# Patient Record
Sex: FEMALE | Race: WHITE | NOT HISPANIC OR LATINO | Employment: PART TIME | URBAN - METROPOLITAN AREA
[De-identification: names, ages, dates, MRNs, and addresses within clinical notes are randomized per-mention and may not be internally consistent; named-entity substitution may affect disease eponyms.]

---

## 2019-11-26 ENCOUNTER — TRANSFERRED RECORDS (OUTPATIENT)
Dept: HEALTH INFORMATION MANAGEMENT | Facility: CLINIC | Age: 29
End: 2019-11-26

## 2019-11-27 ENCOUNTER — TRANSFERRED RECORDS (OUTPATIENT)
Dept: HEALTH INFORMATION MANAGEMENT | Facility: CLINIC | Age: 29
End: 2019-11-27

## 2019-11-28 ENCOUNTER — TRANSFERRED RECORDS (OUTPATIENT)
Dept: HEALTH INFORMATION MANAGEMENT | Facility: CLINIC | Age: 29
End: 2019-11-28

## 2019-12-01 ENCOUNTER — TRANSFERRED RECORDS (OUTPATIENT)
Dept: HEALTH INFORMATION MANAGEMENT | Facility: CLINIC | Age: 29
End: 2019-12-01

## 2019-12-09 ENCOUNTER — TRANSFERRED RECORDS (OUTPATIENT)
Dept: HEALTH INFORMATION MANAGEMENT | Facility: CLINIC | Age: 29
End: 2019-12-09

## 2019-12-11 ENCOUNTER — APPOINTMENT (OUTPATIENT)
Dept: ULTRASOUND IMAGING | Facility: CLINIC | Age: 29
End: 2019-12-11
Attending: EMERGENCY MEDICINE
Payer: COMMERCIAL

## 2019-12-11 ENCOUNTER — APPOINTMENT (OUTPATIENT)
Dept: CT IMAGING | Facility: CLINIC | Age: 29
End: 2019-12-11
Attending: EMERGENCY MEDICINE
Payer: COMMERCIAL

## 2019-12-11 ENCOUNTER — APPOINTMENT (OUTPATIENT)
Dept: ULTRASOUND IMAGING | Facility: CLINIC | Age: 29
End: 2019-12-11
Attending: STUDENT IN AN ORGANIZED HEALTH CARE EDUCATION/TRAINING PROGRAM
Payer: COMMERCIAL

## 2019-12-11 ENCOUNTER — HOSPITAL ENCOUNTER (OUTPATIENT)
Facility: CLINIC | Age: 29
Discharge: HOME | End: 2019-12-11
Attending: EMERGENCY MEDICINE | Admitting: FAMILY MEDICINE
Payer: COMMERCIAL

## 2019-12-11 VITALS
OXYGEN SATURATION: 95 % | RESPIRATION RATE: 18 BRPM | WEIGHT: 217.9 LBS | BODY MASS INDEX: 37.2 KG/M2 | HEART RATE: 78 BPM | SYSTOLIC BLOOD PRESSURE: 110 MMHG | TEMPERATURE: 98.5 F | HEIGHT: 64 IN | DIASTOLIC BLOOD PRESSURE: 72 MMHG

## 2019-12-11 DIAGNOSIS — I26.99 ACUTE PULMONARY EMBOLISM WITHOUT ACUTE COR PULMONALE, UNSPECIFIED PULMONARY EMBOLISM TYPE (H): ICD-10-CM

## 2019-12-11 DIAGNOSIS — R07.9 ACUTE CHEST PAIN: ICD-10-CM

## 2019-12-11 LAB
ALBUMIN SERPL-MCNC: 3.4 G/DL (ref 3.4–5)
ALBUMIN UR-MCNC: NEGATIVE MG/DL
ALP SERPL-CCNC: 105 U/L (ref 40–150)
ALT SERPL W P-5'-P-CCNC: 37 U/L (ref 0–50)
ANION GAP SERPL CALCULATED.3IONS-SCNC: 6 MMOL/L (ref 3–14)
APPEARANCE UR: CLEAR
AST SERPL W P-5'-P-CCNC: 20 U/L (ref 0–45)
BASOPHILS # BLD AUTO: 0.1 10E9/L (ref 0–0.2)
BASOPHILS NFR BLD AUTO: 0.8 %
BILIRUB DIRECT SERPL-MCNC: <0.1 MG/DL (ref 0–0.2)
BILIRUB SERPL-MCNC: 0.2 MG/DL (ref 0.2–1.3)
BILIRUB UR QL STRIP: NEGATIVE
BUN SERPL-MCNC: 18 MG/DL (ref 7–30)
CALCIUM SERPL-MCNC: 8.7 MG/DL (ref 8.5–10.1)
CHLORIDE SERPL-SCNC: 108 MMOL/L (ref 94–109)
CO2 SERPL-SCNC: 27 MMOL/L (ref 20–32)
COLOR UR AUTO: YELLOW
CREAT SERPL-MCNC: 0.6 MG/DL (ref 0.52–1.04)
CREAT SERPL-MCNC: 0.6 MG/DL (ref 0.52–1.04)
CREAT UR-MCNC: 73 MG/DL
D DIMER PPP FEU-MCNC: 1.7 UG/ML FEU (ref 0–0.5)
DIFFERENTIAL METHOD BLD: NORMAL
EOSINOPHIL # BLD AUTO: 0.3 10E9/L (ref 0–0.7)
EOSINOPHIL NFR BLD AUTO: 3.1 %
ERYTHROCYTE [DISTWIDTH] IN BLOOD BY AUTOMATED COUNT: 13.4 % (ref 10–15)
GFR SERPL CREATININE-BSD FRML MDRD: >90 ML/MIN/{1.73_M2}
GFR SERPL CREATININE-BSD FRML MDRD: >90 ML/MIN/{1.73_M2}
GLUCOSE SERPL-MCNC: 140 MG/DL (ref 70–99)
GLUCOSE UR STRIP-MCNC: NEGATIVE MG/DL
HCG SERPL QL: NEGATIVE
HCT VFR BLD AUTO: 39 % (ref 35–47)
HGB BLD-MCNC: 12.5 G/DL (ref 11.7–15.7)
HGB UR QL STRIP: ABNORMAL
IMM GRANULOCYTES # BLD: 0 10E9/L (ref 0–0.4)
IMM GRANULOCYTES NFR BLD: 0.4 %
INR PPP: 1.03 (ref 0.86–1.14)
INTERPRETATION ECG - MUSE: NORMAL
KETONES UR STRIP-MCNC: NEGATIVE MG/DL
LEUKOCYTE ESTERASE UR QL STRIP: ABNORMAL
LMWH PPP CHRO-ACNC: 1.22 IU/ML
LYMPHOCYTES # BLD AUTO: 2.4 10E9/L (ref 0.8–5.3)
LYMPHOCYTES NFR BLD AUTO: 28.1 %
MCH RBC QN AUTO: 30.7 PG (ref 26.5–33)
MCHC RBC AUTO-ENTMCNC: 32.1 G/DL (ref 31.5–36.5)
MCV RBC AUTO: 96 FL (ref 78–100)
MONOCYTES # BLD AUTO: 0.4 10E9/L (ref 0–1.3)
MONOCYTES NFR BLD AUTO: 4.5 %
MUCOUS THREADS #/AREA URNS LPF: PRESENT /LPF
NEUTROPHILS # BLD AUTO: 5.3 10E9/L (ref 1.6–8.3)
NEUTROPHILS NFR BLD AUTO: 63.1 %
NITRATE UR QL: NEGATIVE
NRBC # BLD AUTO: 0 10*3/UL
NRBC BLD AUTO-RTO: 0 /100
PH UR STRIP: 6 PH (ref 5–7)
PLATELET # BLD AUTO: 267 10E9/L (ref 150–450)
PLATELET # BLD AUTO: 268 10E9/L (ref 150–450)
POTASSIUM SERPL-SCNC: 3.5 MMOL/L (ref 3.4–5.3)
PROT SERPL-MCNC: 7.2 G/DL (ref 6.8–8.8)
PROT UR-MCNC: 0.11 G/L
PROT/CREAT 24H UR: 0.15 G/G CR (ref 0–0.2)
RADIOLOGIST FLAGS: ABNORMAL
RBC # BLD AUTO: 4.07 10E12/L (ref 3.8–5.2)
RBC #/AREA URNS AUTO: 1 /HPF (ref 0–2)
SODIUM SERPL-SCNC: 141 MMOL/L (ref 133–144)
SOURCE: ABNORMAL
SP GR UR STRIP: 1.02 (ref 1–1.03)
SQUAMOUS #/AREA URNS AUTO: 4 /HPF (ref 0–1)
TRANS CELLS #/AREA URNS HPF: <1 /HPF (ref 0–1)
TROPONIN I SERPL-MCNC: <0.015 UG/L (ref 0–0.04)
UROBILINOGEN UR STRIP-MCNC: NORMAL MG/DL (ref 0–2)
WBC # BLD AUTO: 8.4 10E9/L (ref 4–11)
WBC #/AREA URNS AUTO: 11 /HPF (ref 0–5)

## 2019-12-11 PROCEDURE — 25000132 ZZH RX MED GY IP 250 OP 250 PS 637: Performed by: EMERGENCY MEDICINE

## 2019-12-11 PROCEDURE — 85049 AUTOMATED PLATELET COUNT: CPT | Performed by: FAMILY MEDICINE

## 2019-12-11 PROCEDURE — 85025 COMPLETE CBC W/AUTO DIFF WBC: CPT | Performed by: EMERGENCY MEDICINE

## 2019-12-11 PROCEDURE — 85610 PROTHROMBIN TIME: CPT | Performed by: FAMILY MEDICINE

## 2019-12-11 PROCEDURE — 93308 TTE F-UP OR LMTD: CPT | Mod: 26 | Performed by: EMERGENCY MEDICINE

## 2019-12-11 PROCEDURE — 99285 EMERGENCY DEPT VISIT HI MDM: CPT | Mod: 25 | Performed by: EMERGENCY MEDICINE

## 2019-12-11 PROCEDURE — 80076 HEPATIC FUNCTION PANEL: CPT | Performed by: EMERGENCY MEDICINE

## 2019-12-11 PROCEDURE — 93308 TTE F-UP OR LMTD: CPT | Performed by: EMERGENCY MEDICINE

## 2019-12-11 PROCEDURE — 96374 THER/PROPH/DIAG INJ IV PUSH: CPT | Performed by: EMERGENCY MEDICINE

## 2019-12-11 PROCEDURE — 84156 ASSAY OF PROTEIN URINE: CPT | Performed by: STUDENT IN AN ORGANIZED HEALTH CARE EDUCATION/TRAINING PROGRAM

## 2019-12-11 PROCEDURE — 93010 ELECTROCARDIOGRAM REPORT: CPT | Mod: 59 | Performed by: EMERGENCY MEDICINE

## 2019-12-11 PROCEDURE — 84703 CHORIONIC GONADOTROPIN ASSAY: CPT | Performed by: EMERGENCY MEDICINE

## 2019-12-11 PROCEDURE — 96365 THER/PROPH/DIAG IV INF INIT: CPT | Performed by: EMERGENCY MEDICINE

## 2019-12-11 PROCEDURE — 25000128 H RX IP 250 OP 636: Performed by: STUDENT IN AN ORGANIZED HEALTH CARE EDUCATION/TRAINING PROGRAM

## 2019-12-11 PROCEDURE — 80048 BASIC METABOLIC PNL TOTAL CA: CPT | Performed by: EMERGENCY MEDICINE

## 2019-12-11 PROCEDURE — 36415 COLL VENOUS BLD VENIPUNCTURE: CPT | Performed by: FAMILY MEDICINE

## 2019-12-11 PROCEDURE — 85379 FIBRIN DEGRADATION QUANT: CPT | Performed by: EMERGENCY MEDICINE

## 2019-12-11 PROCEDURE — 96375 TX/PRO/DX INJ NEW DRUG ADDON: CPT | Performed by: EMERGENCY MEDICINE

## 2019-12-11 PROCEDURE — 36415 COLL VENOUS BLD VENIPUNCTURE: CPT | Performed by: EMERGENCY MEDICINE

## 2019-12-11 PROCEDURE — 96366 THER/PROPH/DIAG IV INF ADDON: CPT

## 2019-12-11 PROCEDURE — G0378 HOSPITAL OBSERVATION PER HR: HCPCS

## 2019-12-11 PROCEDURE — 93005 ELECTROCARDIOGRAM TRACING: CPT | Mod: 59 | Performed by: EMERGENCY MEDICINE

## 2019-12-11 PROCEDURE — 71275 CT ANGIOGRAPHY CHEST: CPT

## 2019-12-11 PROCEDURE — 25000132 ZZH RX MED GY IP 250 OP 250 PS 637: Performed by: STUDENT IN AN ORGANIZED HEALTH CARE EDUCATION/TRAINING PROGRAM

## 2019-12-11 PROCEDURE — 82565 ASSAY OF CREATININE: CPT | Performed by: STUDENT IN AN ORGANIZED HEALTH CARE EDUCATION/TRAINING PROGRAM

## 2019-12-11 PROCEDURE — 25000132 ZZH RX MED GY IP 250 OP 250 PS 637: Performed by: FAMILY MEDICINE

## 2019-12-11 PROCEDURE — 93970 EXTREMITY STUDY: CPT

## 2019-12-11 PROCEDURE — 99291 CRITICAL CARE FIRST HOUR: CPT | Mod: 25 | Performed by: EMERGENCY MEDICINE

## 2019-12-11 PROCEDURE — 85520 HEPARIN ASSAY: CPT | Performed by: EMERGENCY MEDICINE

## 2019-12-11 PROCEDURE — 76830 TRANSVAGINAL US NON-OB: CPT

## 2019-12-11 PROCEDURE — 81001 URINALYSIS AUTO W/SCOPE: CPT | Performed by: STUDENT IN AN ORGANIZED HEALTH CARE EDUCATION/TRAINING PROGRAM

## 2019-12-11 PROCEDURE — 84484 ASSAY OF TROPONIN QUANT: CPT | Performed by: EMERGENCY MEDICINE

## 2019-12-11 PROCEDURE — 25000128 H RX IP 250 OP 636: Performed by: EMERGENCY MEDICINE

## 2019-12-11 RX ORDER — ASPIRIN 81 MG/1
324 TABLET, CHEWABLE ORAL ONCE
Status: COMPLETED | OUTPATIENT
Start: 2019-12-11 | End: 2019-12-11

## 2019-12-11 RX ORDER — IBUPROFEN 200 MG
600 TABLET ORAL EVERY 6 HOURS PRN
Status: DISCONTINUED | OUTPATIENT
Start: 2019-12-11 | End: 2019-12-11 | Stop reason: HOSPADM

## 2019-12-11 RX ORDER — AMOXICILLIN 250 MG
1 CAPSULE ORAL 2 TIMES DAILY PRN
Status: DISCONTINUED | OUTPATIENT
Start: 2019-12-11 | End: 2019-12-11 | Stop reason: HOSPADM

## 2019-12-11 RX ORDER — NALOXONE HYDROCHLORIDE 0.4 MG/ML
.1-.4 INJECTION, SOLUTION INTRAMUSCULAR; INTRAVENOUS; SUBCUTANEOUS
Status: DISCONTINUED | OUTPATIENT
Start: 2019-12-11 | End: 2019-12-11 | Stop reason: HOSPADM

## 2019-12-11 RX ORDER — IOPAMIDOL 755 MG/ML
72 INJECTION, SOLUTION INTRAVASCULAR ONCE
Status: COMPLETED | OUTPATIENT
Start: 2019-12-11 | End: 2019-12-11

## 2019-12-11 RX ORDER — HEPARIN SODIUM 10000 [USP'U]/100ML
0-3500 INJECTION, SOLUTION INTRAVENOUS CONTINUOUS
Status: DISCONTINUED | OUTPATIENT
Start: 2019-12-11 | End: 2019-12-11

## 2019-12-11 RX ORDER — AMOXICILLIN 250 MG
2 CAPSULE ORAL 2 TIMES DAILY PRN
Status: DISCONTINUED | OUTPATIENT
Start: 2019-12-11 | End: 2019-12-11 | Stop reason: HOSPADM

## 2019-12-11 RX ORDER — IBUPROFEN 200 MG
200 TABLET ORAL EVERY 4 HOURS PRN
COMMUNITY

## 2019-12-11 RX ORDER — LIDOCAINE 40 MG/G
CREAM TOPICAL
Status: DISCONTINUED | OUTPATIENT
Start: 2019-12-11 | End: 2019-12-11 | Stop reason: HOSPADM

## 2019-12-11 RX ORDER — WARFARIN SODIUM 5 MG/1
TABLET ORAL
Qty: 10 TABLET | Refills: 0 | Status: SHIPPED | OUTPATIENT
Start: 2019-12-11 | End: 2019-12-13 | Stop reason: DRUGHIGH

## 2019-12-11 RX ORDER — WARFARIN SODIUM 5 MG/1
5 TABLET ORAL
Status: COMPLETED | OUTPATIENT
Start: 2019-12-11 | End: 2019-12-11

## 2019-12-11 RX ORDER — OXYCODONE HYDROCHLORIDE 5 MG/1
5-10 TABLET ORAL EVERY 6 HOURS PRN
Status: DISCONTINUED | OUTPATIENT
Start: 2019-12-11 | End: 2019-12-11 | Stop reason: HOSPADM

## 2019-12-11 RX ORDER — ACETAMINOPHEN 325 MG/1
650 TABLET ORAL EVERY 4 HOURS PRN
Status: DISCONTINUED | OUTPATIENT
Start: 2019-12-11 | End: 2019-12-11 | Stop reason: HOSPADM

## 2019-12-11 RX ORDER — ACETAMINOPHEN 325 MG/1
325-650 TABLET ORAL EVERY 6 HOURS PRN
COMMUNITY

## 2019-12-11 RX ADMIN — WARFARIN SODIUM 5 MG: 5 TABLET ORAL at 17:04

## 2019-12-11 RX ADMIN — IBUPROFEN 600 MG: 200 TABLET, FILM COATED ORAL at 13:10

## 2019-12-11 RX ADMIN — ACETAMINOPHEN 650 MG: 325 TABLET, FILM COATED ORAL at 15:45

## 2019-12-11 RX ADMIN — ASPIRIN 81 MG CHEWABLE TABLET 324 MG: 81 TABLET CHEWABLE at 00:45

## 2019-12-11 RX ADMIN — ACETAMINOPHEN 650 MG: 325 TABLET, FILM COATED ORAL at 10:47

## 2019-12-11 RX ADMIN — IOPAMIDOL 72 ML: 755 INJECTION, SOLUTION INTRAVENOUS at 01:22

## 2019-12-11 RX ADMIN — HEPARIN SODIUM 1800 UNITS/HR: 10000 INJECTION, SOLUTION INTRAVENOUS at 01:56

## 2019-12-11 RX ADMIN — ACETAMINOPHEN 650 MG: 325 TABLET, FILM COATED ORAL at 05:11

## 2019-12-11 RX ADMIN — ENOXAPARIN SODIUM 100 MG: 100 INJECTION, SOLUTION INTRAVENOUS; SUBCUTANEOUS at 10:35

## 2019-12-11 RX ADMIN — SERTRALINE HYDROCHLORIDE 50 MG: 50 TABLET ORAL at 07:59

## 2019-12-11 ASSESSMENT — ACTIVITIES OF DAILY LIVING (ADL)
AMBULATION: 0-->INDEPENDENT
SWALLOWING: 0-->SWALLOWS FOODS/LIQUIDS WITHOUT DIFFICULTY
RETIRED_COMMUNICATION: 0-->UNDERSTANDS/COMMUNICATES WITHOUT DIFFICULTY
ADLS_ACUITY_SCORE: 10
BATHING: 0-->INDEPENDENT
FALL_HISTORY_WITHIN_LAST_SIX_MONTHS: NO
ADLS_ACUITY_SCORE: 10
DRESS: 0-->INDEPENDENT
TRANSFERRING: 0-->INDEPENDENT
ADLS_ACUITY_SCORE: 10
TOILETING: 0-->INDEPENDENT
COGNITION: 0 - NO COGNITION ISSUES REPORTED
RETIRED_EATING: 0-->INDEPENDENT

## 2019-12-11 ASSESSMENT — ENCOUNTER SYMPTOMS
LIGHT-HEADEDNESS: 0
WEAKNESS: 0
WHEEZING: 0
FLANK PAIN: 0
PHOTOPHOBIA: 0
ABDOMINAL PAIN: 1
DYSURIA: 0
FEVER: 0
SEIZURES: 0
NAUSEA: 0
DECREASED CONCENTRATION: 0
HEADACHES: 1
DIZZINESS: 0
BLOOD IN STOOL: 0
APPETITE CHANGE: 0
ABDOMINAL DISTENTION: 0
ACTIVITY CHANGE: 1
CONFUSION: 0
VOMITING: 0
PALPITATIONS: 0
SHORTNESS OF BREATH: 0
DIFFICULTY URINATING: 0
CHEST TIGHTNESS: 0
COUGH: 0

## 2019-12-11 ASSESSMENT — PAIN DESCRIPTION - DESCRIPTORS
DESCRIPTORS: HEADACHE
DESCRIPTORS: HEADACHE

## 2019-12-11 ASSESSMENT — MIFFLIN-ST. JEOR
SCORE: 1770.95
SCORE: 1693.39

## 2019-12-11 NOTE — ED TRIAGE NOTES
BIBA from home c/o shortness of breath and substernal chest pain with deep breaths. . 18g PIV, right AC. EMS gave 4mg IV zofran.

## 2019-12-11 NOTE — PROGRESS NOTES
Admission     Admitted from:  UED  Via: светлана   Reason for admission: SOB and Chest pain.   Accompanied by: Danette RN  Family aware of admission: Yes   Belongings: Pt declines sending any belongings/valuables to security at this time. Denies having medications from home  Access: Right PIV  Tele: placed on pt  Height/weight/VS : obtained  Admission profile:  completed  Teaching: Oriented pt to room, use of call light, unit routine. Educated pt on importance of safety, infection prevention, when to call RN (angina, SOB, lightheadedness/dizziness, etc). Pt verbalized understanding.  Skin assessment: completed by Jameel BALDWIN

## 2019-12-11 NOTE — ED NOTES
Bed: ED33  Expected date:   Expected time:   Means of arrival:   Comments:  H414  29 yr F  Chest pain/Shortness of breath  Yellow

## 2019-12-11 NOTE — PLAN OF CARE
"/83 (BP Location: Left arm)   Pulse 78   Temp 98.2  F (36.8  C) (Oral)   Resp 18   Ht 1.626 m (5' 4\")   Wt 98.8 kg (217 lb 14.4 oz)   SpO2 98%   BMI 37.40 kg/m      Neuro: A&Ox4  Cardiac: VSS. SR. States chest pain has improved  Respiratory:  Maintaining adequate O2 sats via RA. Denies SOB.   GI/: voiding without difficulty. LBM yesterday. UA sent   Diet/appetite: Tolerating regular diet. Denies nausea  Activity: Up ad guanakito.   Pain: C/o migraine- tylenol and ibuprofen given with some relief.   Skin: No new skin deficits noted. Incision from  liquid bandaged-CDI and MAX.   LDA's: PIV saline locked.     Plan: Heparin gtt discontinued. Starting lovenox and coumadin today. Educated patient how to administer subcutaneous lovenox injections. Pt is pumping and saving breast milk in lactation room on the first floor. Transvaginal US completed. Plan to possibly discharge tonight vs. Tomorrow morning. Nursing will continue to follow the POC and update the MD team with concerns.      "

## 2019-12-11 NOTE — DISCHARGE SUMMARY
[facility] Discharge Summary       Date of Admission:  2019  Date of Discharge:  2019  6:15 PM  Date of Service: 2019  Discharging Attending Provider: Dr. Feliciano  Discharge Team: [facility] Service    Discharge Diagnoses      Acute pulmonary embolism without acute cor pulmonale, unspecified pulmonary embolism type (H)  Acute chest pain    Follow-ups Needed After Discharge   - Daily INR while bridging from Lovenox to warfarin. Scheduled at [facility] per Care Coordinator.   - Follow-up with PCP within one week.   - Consider starting metformin for PCOS  - F/u on passing blood clots after  on     Hospital Course   Mesha Fields was admitted on 2019 for bilateral PEs following  on  at 23w3d GA for PPROM.  (Pregnancy complicated by pyelnonephritis requiring intensive care admission). The following problems were addressed during her hospitalization:    # Bilateral PEs  # DVTs  # SOB  # Chest pain  Patient presented to ED one hour after sudden onset of chest pain and SOB that did not respond to oxycodone. LE ultrasound showed bilateral DVTs and CT PE showed small bilateral PEs. Risk factors for PE include: post-partum, s/p , recent immobility, obesity. Patient started on heparin, then transitioned to and discharged on Lovenox.  Anticipate bridging for 5 days and then will be on warfarin for 3 months. Will do daily INRs at [facility] (she's currently staying at [facility] while her infant is in the [facility]). Pharmacy recommended starting warfarin at 5mg daily. Advised patient lovenox and warfarin are safe medications while breastfeeding.  - Lovenox 100mg BID for 5 days  - Warfarin 5 mg daily   - Tylenol PRN for pain (avoid ibuprofen if possible)     # Pulmonary nodule   Incidental <4mm nodules found on CT. Per radiology guidelines as no outstanding risk factors  no indication for follow up at this time. Consider work-up if new symptoms arise.      # Continued lochia  Patient continues to pass clots, about a quarter size noted in the toliet, not in liner and otherwise no spotting. Denies vaginal pain, unusual discharge, malodor. Abdominal pain at surgical site feels superficial. Hemodynamically stable. Pelvic US couldn't r/o retained products, but otherwise asymptomatic reassuring and still wnl at 13 days out from .   - F/u with PCP if symptoms do not improve     # Migraine  Similar to previous chronic migraines, though less intense. Doesn't drink coffee, has not tried medications for it other than tylenol. Urine protein/creatinine levels are normal and no HTN, unlikely to be related to Pre-E. Could try triptans in outpatient setting.     # Anxiety   - Continue PTA Zoloft      # PCOS  Not on metformin, had been trying weight loss, but interested in metformin. Can consider starting outpatient.     # Discharge Pain Plan:  - During her hospitalization, Mesha experienced pain due to  incision. Denies lung or chest pain. The pain plan for discharge was discussed with Mesha and the plan was created in a collaborative fashion.    - Continued opioids since : Oxycodone Q6 PRN    Consultations This Hospital Stay   PHARMACY TO DOSE WARFARIN    Code Status   Full Code      Aleksandr Gaxiola, MS3    Sarah Pompa, PGY-1  [facility], [facility]   ______________________________________________________________________  Review of Systems   Constitutional: Positive for activity change. Negative for appetite change and fever.   Eyes: Positive for visual disturbance (some blurry vision in left eye, c/w typical migraines). Negative for photophobia.   Respiratory: Negative for cough, chest tightness, shortness of breath and wheezing.    Cardiovascular: Negative for chest pain, palpitations and leg swelling.    Gastrointestinal: Positive for abdominal pain (suprapubic at level of incision). Negative for abdominal distention, blood in stool, nausea and vomiting.   Genitourinary: Positive for vaginal bleeding (persistent lochia, as described above). Negative for difficulty urinating, dysuria, flank pain, vaginal discharge and vaginal pain.   Skin: Negative for rash.   Neurological: Positive for headaches (chronic, baseline). Negative for dizziness, seizures, weakness and light-headedness.   Psychiatric/Behavioral: Negative for confusion and decreased concentration.     Physical Exam   Vital Signs: Temp: 98.2  F (36.8  C) Temp src: Oral BP: 111/83 Pulse: 78 Heart Rate: 78 Resp: 18 SpO2: 98 % O2 Device: None (Room air)    Weight: 217 lbs 14.4 oz    Constitutional:       Appearance: She is obese.      Comments: No acute distress, cooperative   HENT:      Head: Normocephalic and atraumatic.   Cardiovascular:      Rate and Rhythm: Normal rate and regular rhythm.      Pulses: Normal pulses.      Heart sounds: No murmur.   Pulmonary:      Effort: Pulmonary effort is normal.      Breath sounds: Normal breath sounds.   Abdominal:      Comments: Low transverse incision clean, dry and intact, no erythema, mildly tender to palpation   Musculoskeletal:      Comments: Mild tenderness to palpation of bilateral calves L>R, no appreciable asymmetry or edema    Skin:     General: Skin is warm and dry.   Neurological:      Mental Status: She is alert and oriented to person, place, and time.   Psychiatric:         Behavior: Behavior normal.         Judgment: Judgment normal.     Significant Results and Procedures   Most Recent 3 CBC's:  Recent Labs   Lab Test 12/11/19  0806 12/11/19  0032   WBC  --  8.4   HGB  --  12.5   MCV  --  96    268     Most Recent 3 BMP's:  Recent Labs   Lab Test 12/11/19  0806 12/11/19  0032   NA  --  141   POTASSIUM  --  3.5   CHLORIDE  --  108   CO2  --  27   BUN  --  18   CR 0.60 0.60   ANIONGAP  --  6    AMANDA  --  8.7   GLC  --  140*     Most Recent 2 LFT's:  Recent Labs   Lab Test 12/11/19  0032   AST 20   ALT 37   ALKPHOS 105   BILITOTAL 0.2     Most Recent 3 INR's:  Recent Labs   Lab Test 12/11/19  1101   INR 1.03     Most Recent 3 Creatinines:  Recent Labs   Lab Test 12/11/19  0806 12/11/19  0032   CR 0.60 0.60     Most Recent D-dimer:  Recent Labs   Lab Test 12/11/19  0032   DD 1.7*     Most Recent Urinalysis:  Recent Labs   Lab Test 12/11/19  1315   COLOR Yellow   APPEARANCE Clear   URINEGLC Negative   URINEBILI Negative   URINEKETONE Negative   SG 1.020   UBLD Moderate*   URINEPH 6.0   PROTEIN Negative   NITRITE Negative   LEUKEST Large*   RBCU 1   WBCU 11*       Pending Results   These results will be followed up by PCP  Unresulted Labs Ordered in the Past 30 Days of this Admission     No orders found from 11/11/2019 to 12/12/2019.             Primary Care Physician   Elida Aranda    Discharge Disposition   Discharged to [facility]  Condition at discharge: Stable    Discharge Orders      Discharge Instructions    Coumadin monitoring and the first lab draw has been arranged for you.  Your first INR (lab draw) and Nurse Practitioner appointment is, tomorrow, Thursday 12/12/19 at 310pm    [facility]  [facility] [place]  Floor 5  [address] [address]  Appointments: [phone]     Discharge Medications   Current Discharge Medication List      CONTINUE these medications which have NOT CHANGED    Details   acetaminophen (TYLENOL) 325 MG tablet Take 325-650 mg by mouth every 6 hours as needed for mild pain      ibuprofen (ADVIL/MOTRIN) 200 MG tablet Take 200 mg by mouth every 4 hours as needed for mild pain      sertraline (ZOLOFT) 50 MG tablet Take 50 mg by mouth daily           Allergies   No Known Allergies

## 2019-12-11 NOTE — ED NOTES
[facility], [place]   ED Nurse to Floor Handoff     Mesha Fields is a 29 year old female who speaks Data Unavailable and lives with others,  in a home  They arrived in the ED by ambulance from [facility] c/o shortness of breath with chest pain on deep breath. Was diagnosed with LLE DVT 12/10/19. Gave birth to a 23 week old pre-mature baby 12 days ago, baby is at [facility] in [facility]. CT showing PE.    ED Chief Complaint: Shortness of Breath    ED Dx;   Final diagnoses:   Acute pulmonary embolism without acute cor pulmonale, unspecified pulmonary embolism type (H)   Acute chest pain         Needed?: No    Allergies: No Known Allergies.  Past Medical Hx: History reviewed. No pertinent past medical history.   Baseline Mental status: WDL  Current Mental Status changes: at basesline    Infection present or suspected this encounter: no  Sepsis suspected: No  Isolation type: No active isolations     Activity level - Baseline/Home:  Independent  Activity Level - Current:   Independent    Bariatric equipment needed?: No    In the ED these meds were given:   Medications   heparin  drip 25,000 units in 0.45% NaCl 250 mL (see additional administration details for dose) (1,800 Units/hr Intravenous New Bag 12/11/19 0156)   heparin bolus from infusion pump (has no administration in time range)   aspirin (ASA) chewable tablet 324 mg (324 mg Oral Given 12/11/19 0045)   iopamidol (ISOVUE-370) solution 72 mL (72 mLs Intravenous Given 12/11/19 0122)   sodium chloride (PF) 0.9% PF flush 100 mL (100 mLs Intravenous Given 12/11/19 0122)   heparin ANTICOAGULANT Loading dose from infusion pump *Give when STARTING heparin infusion 8,000 Units (8,000 Units Intravenous Given 12/11/19 0155)       Drips running?  Yes, heparin @ 1800 units/hr.    Home pump  No    Current LDAs  Peripheral IV 12/11/19 Right (Active)   Site Assessment WDL 12/11/2019 12:29 AM   Line Status Saline locked  12/11/2019 12:29 AM   Phlebitis Scale 0-->no symptoms 12/11/2019 12:29 AM   Infiltration Scale 0 12/11/2019 12:29 AM   Extravasation? No 12/11/2019 12:29 AM   Number of days: 0       Labs results:   Labs Ordered and Resulted from Time of ED Arrival Up to the Time of Departure from the ED   BASIC METABOLIC PANEL - Abnormal; Notable for the following components:       Result Value    Glucose 140 (*)     All other components within normal limits   D DIMER QUANTITATIVE - Abnormal; Notable for the following components:    D Dimer 1.7 (*)     All other components within normal limits   TROPONIN I   CBC WITH PLATELETS DIFFERENTIAL   HCG QUALITATIVE   PLATELETS MONITORED PER HEPARIN TREATMENT PROTOCOL (FOR MEANINGFUL USE   CARDIAC CONTINUOUS MONITORING   NOTIFY PHYSICIAN   NOTIFY PHYSICIAN       Imaging Studies:   Recent Results (from the past 24 hour(s))   POC US ECHO LIMITED    Impression    Limited Bedside ED Cardiac Ultrasound  PROCEDURE: PERFORMED BY: Dr. Kevin Anglin MD  INDICATIONS/SYMPTOM:  Chest Pain and Shortness of Breath  PROBE: Cardiac phased array probe  BODY LOCATION: Chest (cardiac)  FINDINGS: The ultrasound was performed utilizing the subcostal, parasternal long axis, parasternal short axis and apical 4 chamber views.  Grossly normal LV contractility. No RV dilation visualized. No pericardial effusion visualized. IVC grossly normal in diameter with < 50% respiratory variability.   INTERPRETATION:    Grossly normal LV contractility. No pericardial effusion. No RV dilation. IVC size and variability consistent with normovolemia.   IMAGE DOCUMENTATION: Images were archived to PACs system.     CT Chest Pulmonary Embolism w Contrast    Narrative    EXAM: CT CHEST PULMONARY EMBOLISM W CONTRAST  LOCATION: [facility]  DATE/TIME: 12/11/2019 1:10 AM    INDICATION: Sudden onset pleuritic chest pain, superficial venous thrombosis in the calf.    COMPARISON: None.    TECHNIQUE: CT angiogram chest  "during arterial phase injection IV contrast. 2D and 3D MIP reconstructions were performed by the CT technologist. Dose reduction techniques were used.     CONTRAST: 72 mL Isovue-370.    FINDINGS:  ANGIOGRAM CHEST: Tiny burden of acute bilateral pulmonary emboli. Thoracic aorta is negative for dissection. No CT evidence of right heart strain.    LUNGS AND PLEURA: Several 4 mm and smaller pulmonary nodules, likely benign though technically indeterminate.    MEDIASTINUM/AXILLAE: Normal.    UPPER ABDOMEN: Normal.    MUSCULOSKELETAL: Normal.      Impression    IMPRESSION:  1.  Tiny burden of bilateral acute pulmonary emboli without evidence for right heart strain.    2.  4 mm and smaller pulmonary nodules. If no prior see guidelines below.    Results called to Dr. Anglin at 1:35 a.m. 12/11/2019.    REFERENCE:  Guidelines for Management of Incidental Pulmonary Nodules Detected on CT Images: From the Fleischner Society 2017.   Guidelines apply to incidental nodules in patients who are 35 years or older.  Guidelines do not apply to lung cancer screening, patients with immunosuppression, or patients with known primary cancer.    MULTIPLE NODULES  Nodule size < 6 mm  Low-risk patients: No follow-up needed.  High-risk patients: Optional follow-up at 12 months.    Nodule size 6 mm or larger  Low-risk patients: Follow-up CT at 3-6 months, then consider CT at 18-24 months.  High-risk patients: Follow-up CT at 3-6 months, then at 18-24 months if no change.  - Use most suspicious nodule as guide to management.    Consider referral to lung nodule clinic.         Recent vital signs:   /77   Pulse 95   Temp 97.8  F (36.6  C) (Oral)   Resp 20   Ht 1.676 m (5' 6\")   Wt 103.4 kg (228 lb)   SpO2 97%   BMI 36.80 kg/m      Meño Coma Scale Score: 15 (12/11/19 0039)       Cardiac Rhythm: Normal Sinus  Pt needs tele? Yes  Skin/wound Issues: None    Code Status: Full Code    Pain control: good    Nausea control: pt had " none    Abnormal labs/tests/findings requiring intervention: CT shows pulmonary embolism, patient now on heparin drip.    Family present during ED course? No   Family Comments/Social Situation comments: patient communicating with family via cell phone. [place] [facility]    Tasks needing completion: None    Rudolph Sanders, RN  [phone] [facility]

## 2019-12-11 NOTE — PROGRESS NOTES
Care Coordinator Discharge Note    Admission Date/Time:  12/11/2019  Attending MD:  Stew Feliciano MD    Data  Chart reviewed, discussed with interdisciplinary team.   Patient was admitted for:    Acute pulmonary embolism without acute cor pulmonale, unspecified pulmonary embolism type (H)  Acute chest pain.    Concerns with insurance coverage for discharge needs: None.  Current Living Situation: Patient lives with family.  Support System: Supportive and Involved  Services Involved: none  Transportation at Discharge: patient visitor shuttle  Transportation to Medical Appointments:  - patient/visitor shuttle, taxi/Uber/etc, walk if weather permits  Barriers to Discharge: none    Coordination of Care  D: Plan of care discussed with Medical Team at Interdisciplinary Rounds, plan for patient to discharge today with subcutaneous Lovenox and Coumadin    I/A: Chart reviewed; met with patient at bedside to confirm current living situation and transportation. Patient is living at [facility]; she gave birth to a 23 week old pre-mature baby 12 days ago, baby is at [facility] in [facility]. Patient has no transportation aside from paying for taxi/Uber/etc. [place], [place] the [facility].     Arranged for patient to have Coumadin followed at the [facility] Clinic at [facility]. [facility] is across the street from [facility]; patient can walk to her appointments/blood draws (<1 block, door to door). First appointment is tomorrow, Thursday, 12/12/19 at 310pm.     Patient will need to talk the patient visitor shuttle back to [facility] (requested beside RN to please escort her to the  location). If shuttle has stopped running at time of discharge please page [facility] to arrange a taxi ride.    P: Care Coordinator will remain available for discharge needs that may arise.      Referrals  [facility]  [facility]  [facility] [place]  Floor 5  [address] [address]  Appointments: [phone]    First INR draw and Nurse Practitioner appointment is Thursday 12/12/19 at 310pm      Plan  Anticipated Discharge Date:  Later today  Anticipated Discharge Plan:  Return to [facility]    Rebecca Gauthier RN, BSN, PHN  Internal Medicine Care Coordinator  [facility]  Desk Phone: [phone]  Pager: [phone]    To contact Weekend RNCC, dial [phone] and enter job code [phone] at prompt.   This pager can not be contacted by text page or outside line.

## 2019-12-11 NOTE — UTILIZATION REVIEW
"Admission Status; Secondary Review Determination     Under the authority of the Utilization Management Committee, the utilization review process indicated a secondary review on the above patient.  The review outcome is based on review of the medical records, discussions with staff, and applying clinical experience noted on the date of the review.       (x) Observation Status Appropriate - This patient does not meet hospital inpatient criteria and is placed in observation status. If this patient's primary payer is Medicare and was admitted as an inpatient, Condition Code 44 should be used and patient status changed to \"observation\".     RATIONALE FOR DETERMINATION:  29-year-old female who is 13 days postpartum from a  at gestational age of 23 weeks and 3 days with baby at the children's  intensive care unit.  Patient now admitted due to acute onset of chest discomfort and found to have bilateral acute pulmonary emboli without evidence of right heart strain.  Observation care appropriate to initiate anticoagulation and monitor for signs and symptoms of complicated pulmonary emboli.  Expected duration of care is consistent with observation services. Case discussed with treating team.      The severity of illness, intensity of service provided, expected LOS and risk for adverse outcome make the care appropriate for further observation; however, doesn't meet criteria for hospital inpatient admission. This was discussed with attending physician who concurred with this determination.    The information on this document is developed by the utilization review team in order for the business office to ensure compliance.  This only denotes the appropriateness of proper admission status and does not reflect the quality of care rendered.         The definitions of Inpatient Status and Observation Status used in making the determination above are those provided in the CMS Coverage Manual, Chapter 1 and Chapter 6, " section 70.4.      Sincerely,     Mark Palma MD    Physician Advisor  Utilization Review/ Case Management  [facility].

## 2019-12-11 NOTE — PLAN OF CARE
D:pt admitted for SOB and chest pain. CT found with bilateral acute pulmonary emboli without evidence of right heart strain. Lower extremity ultrasound found nonocclusive thrombus in the right popliteal vein and left gastrocnemius vein. History of of  at 23 weeks and 3 days for PPROM on 2019, Anxiety and polycystic ovarian syndrome.      I: Monitored vitals and assessed pt status.   Changed:   Running: Heparin 1800 units/hr. 10a recheck at 8:00 aam   PRN: Tylenol        A:   Neuro: A&O x 4. Calls appropriately   Vitals. Afebrile, VSS   Cardiac: SR. Denies chest pain   Respiratory: sating 90's on RA. SOB with activity   Diet:  regular diet    Activity:  independent  Pain: headache PRN tylenol given.   Skin: no skin deficit noted,  incision open to air, C/D/I.   LDAs: PIV      P: Continue to monitor Pt status and report changes to treatment team.

## 2019-12-11 NOTE — H&P
[facility] History and Physical        Date of Admission:  2019  Date of Service: 2019         HPI      Chief Complaint   SOB    History is obtained from the patient    History of Present Illness   Mesha is a 29 year old female  PPD #13 who has a history of  at 23 weeks and 3 days for PPROM, anxiety and PCOS and is admitted for SOB found to have bilateral PEs.    Patient with recent history of  secondary to [facility].  Patient was 23 weeks and 3 days. Patient [place], [place] of the [facility] area. She denies any outstanding concerns with this pregnancy including preeclampsia and normal vaginal delivery with prior gestation.  Patient did require 2 hospital admissions for urinary tract traction during pregnancy at [facility].  Patient was complaining of calf pain on Monday morning while still at [facility] and a ultrasound was completed and found superficial DVT with no further concern. Today patient was lying in bed watching TV when had a sudden onset of pressure and pain throughout her chest.  She proceeded to take an oxycodone and ibuprofen with no improvement within the hour.  She called her family which advised her to call 911 for further evaluation.  She is currently at the [facility] as [facility].  Denies any family currently in the [facility] with her.  Patient reports that her diaphoresis has resolved.  She now feels mild shortness of breath and improving chest pain.  She also endorses an ongoing headache.  Patient does have mild abdominal pain secondary to  incision.  She denies any discharge or redness from incision.  Patient denies any fevers, chills, nausea or vomiting. Patient is still passing dime size clots. Denies any foul smelling discharge.     In the  emergency department patient was afebrile and hemodynamically stable.  She was saturating well on room air.  Labs obtained included a BMP, hCG, troponin, CBC and d-dimer.  D-dimer was elevated to 1.7.  Electrolytes were within normal limits.  Troponin was unremarkable.  Bedside echo with no evidence of heart strain.  CT PE found with bilateral acute pulmonary emboli without evidence of right heart strain.  Pulmonary nodule noted.  Lower extremity ultrasound found nonocclusive thrombus in the right popliteal vein and left gastrocnemius vein. EKG obtained was WNL.            History:   Review of Systems   The 10 point Review of Systems is negative other than noted in the HPI.    Past Medical History    I have reviewed this patient's medical history and updated it with pertinent information if needed.   Past Medical History:   Diagnosis Date     Anxiety      PCOS (polycystic ovarian syndrome)         Past Surgical History   I have reviewed this patient's surgical history and updated it with pertinent information if needed.  Past Surgical History:   Procedure Laterality Date      SECTION  2019    PPROM, 32w3d      Social History   Social History     Tobacco Use     Smoking status: Never Smoker   Substance Use Topics     Alcohol use: Not Currently     Drug use: Never     Family History   I have reviewed this patient's family history and updated it with pertinent information if needed.   Family History   Family history unknown: Yes   Denies any known family history of hemophilia or other etiology.    Prior to Admission Medications   Prior to Admission Medications   Prescriptions Last Dose Informant Patient Reported? Taking?   acetaminophen (TYLENOL) 325 MG tablet   Yes Yes   Sig: Take 325-650 mg by mouth every 6 hours as needed for mild pain   ibuprofen (ADVIL/MOTRIN) 200 MG tablet   Yes Yes   Sig: Take 200 mg by mouth every 4 hours as needed for mild pain   sertraline (ZOLOFT) 50 MG tablet   Yes Yes   Sig:  Take 50 mg by mouth daily      Facility-Administered Medications: None     Allergies   No Known Allergies        Physical Exam      Vital Signs: Temp: 97.8  F (36.6  C) Temp src: Oral BP: 117/74 Pulse: 87 Heart Rate: 78 Resp: 17 SpO2: 97 % O2 Device: None (Room air)    Weight: 228 lbs 0 oz    Physical Exam  Constitutional:       Appearance: She is obese.      Comments: Appears uncomfortable, but cooperative   HENT:      Head: Normocephalic and atraumatic.   Cardiovascular:      Rate and Rhythm: Normal rate and regular rhythm.      Pulses: Normal pulses.      Heart sounds: No murmur.   Pulmonary:      Effort: Pulmonary effort is normal.      Breath sounds: Normal breath sounds.   Abdominal:      Comments: Low transverse incision clean, dry and intact, no erythema, mildly tender to palpation   Musculoskeletal:      Comments: Mild tenderness to palpation of bilateral calves, no appreciated asymmetry    Skin:     General: Skin is warm and dry.      Capillary Refill: Capillary refill takes less than 2 seconds.   Neurological:      Mental Status: She is alert and oriented to person, place, and time.   Psychiatric:         Behavior: Behavior normal.         Judgment: Judgment normal.         Assessment & Plan      Mesha is a 29 year old female admitted on 2019. Patient is currently PPD #13 from a  at 23 weeks and 3 days for PPROM with PMHx of anxiety and PCOS and is admitted for SOB found to have bilateral PEs.    # Bilateral PEs  # DVTs  # SOB  # Chest pain  Patient presents after sudden onset of chest pain and SOB. Differential consideration includes, but not limited to pulmonary embolism, anxiety, ACS, costochondritis, pneumonia, esophageal spasm or other etiology. Labs noted with elevated D-dimer with CT PE finding small bilateral PEs. Patient started on heparin in the ED and placed for admission. High risk factor is due to patient being post partum. No outstanding family or personal history of clots,  though does state family history is limited. Will continue to monitor with anticipation for transitioning to oral anticoagulate as soon as able. Of note, patient is breastfeeding and pumping for child at this time.   - Oxycodone 5-10 mg PO, PRN  - Oxygen PRN  - Continuous pulse ox  - Heparin gtt  - Vitals Q4H   - Tylenol PRN     # Pulmonary nodule   Incidental nodule founds on CT with several 4 mm and smaller nodules. Per radiology guidelines as no outstanding risk factors no indication for follow up at this time.     # Anxiety   - Continue PTA Zoloft     # Pain Assessment:   - Mesha is experiencing pain due to recent c section and PE. Pain management was discussed and the plan was created in a collaborative fashion.  Mesha's response to the current recommendations: agreeable  - Please see the plan for pain management as documented above    Diet: Regular diet  Fluids: PO  DVT Prophylaxis: Heparin gtt  Code Status: Full Code    Disposition Plan   Expected discharge: 1-2 days; recommended to prior living arrangement once anticoagulation is established. Dispo: Expected Discharge Date: 12/12/19        Entered: Krystal Vergara 12/11/2019, 3:50 AM   Information in the above section will display in the discharge planner report.    Discussed with faculty but not examined by faculty.    Krystal Vergara    [facility]  [facility]   Pager: [phone]  Please see sticky note for cross cover information      Results:     Data   Recent Labs   Lab 12/11/19  0032   WBC 8.4   HGB 12.5   MCV 96         POTASSIUM 3.5   CHLORIDE 108   CO2 27   BUN 18   CR 0.60   ANIONGAP 6   AMANDA 8.7   *   TROPI <0.015     Recent Results (from the past 24 hour(s))   POC US ECHO LIMITED    Impression    Limited Bedside ED Cardiac Ultrasound  PROCEDURE: PERFORMED BY: Dr. Kevin Anglin MD  INDICATIONS/SYMPTOM:  Chest Pain and Shortness of Breath  PROBE: Cardiac phased array probe  BODY  LOCATION: Chest (cardiac)  FINDINGS: The ultrasound was performed utilizing the subcostal, parasternal long axis, parasternal short axis and apical 4 chamber views.  Grossly normal LV contractility. No RV dilation visualized. No pericardial effusion visualized. IVC grossly normal in diameter with < 50% respiratory variability.   INTERPRETATION:    Grossly normal LV contractility. No pericardial effusion. No RV dilation. IVC size and variability consistent with normovolemia.   IMAGE DOCUMENTATION: Images were archived to PACs system.     CT Chest Pulmonary Embolism w Contrast    Narrative    EXAM: CT CHEST PULMONARY EMBOLISM W CONTRAST  LOCATION: [facility]  DATE/TIME: 12/11/2019 1:10 AM    INDICATION: Sudden onset pleuritic chest pain, superficial venous thrombosis in the calf.    COMPARISON: None.    TECHNIQUE: CT angiogram chest during arterial phase injection IV contrast. 2D and 3D MIP reconstructions were performed by the CT technologist. Dose reduction techniques were used.     CONTRAST: 72 mL Isovue-370.    FINDINGS:  ANGIOGRAM CHEST: Tiny burden of acute bilateral pulmonary emboli. Thoracic aorta is negative for dissection. No CT evidence of right heart strain.    LUNGS AND PLEURA: Several 4 mm and smaller pulmonary nodules, likely benign though technically indeterminate.    MEDIASTINUM/AXILLAE: Normal.    UPPER ABDOMEN: Normal.    MUSCULOSKELETAL: Normal.      Impression    IMPRESSION:  1.  Tiny burden of bilateral acute pulmonary emboli without evidence for right heart strain.    2.  4 mm and smaller pulmonary nodules. If no prior see guidelines below.    Results called to Dr. Anglin at 1:35 a.m. 12/11/2019.    REFERENCE:  Guidelines for Management of Incidental Pulmonary Nodules Detected on CT Images: From the Fleischner Society 2017.   Guidelines apply to incidental nodules in patients who are 35 years or older.  Guidelines do not apply to lung cancer screening, patients with  immunosuppression, or patients with known primary cancer.    MULTIPLE NODULES  Nodule size < 6 mm  Low-risk patients: No follow-up needed.  High-risk patients: Optional follow-up at 12 months.    Nodule size 6 mm or larger  Low-risk patients: Follow-up CT at 3-6 months, then consider CT at 18-24 months.  High-risk patients: Follow-up CT at 3-6 months, then at 18-24 months if no change.  - Use most suspicious nodule as guide to management.    Consider referral to lung nodule clinic.     US Lower Extremity Venous Duplex Bilateral    Narrative    Preliminary Report - The following report is a preliminary  interpretation.      Impression    IMPRESSION:  1. Nonocclusive thrombus in the right popliteal vein.  2. Nonocclusive thrombus in the left gastrocnemius vein.

## 2019-12-11 NOTE — ED PROVIDER NOTES
"  History     Chief Complaint   Patient presents with     Shortness of Breath     HPI  Mesha Fields is a 29 year old female with PMH notable for calf superficial vein thrombosis diagnosed 1 day ago at an outside facility, 12 days postpartum after early delivery at 23 weeks who presents ED with chest pain.  Patient reports onset shortly before arrival.  She was laying in bed when she had sudden acute onset of pain in the chest radiating to the mid back.  Patient feels short of breath, and has increased pain when trying to take a breath.  She does feel short of breath.  Patient reports feeling her normal state of health before onset of the pain.  She notes having an ultrasound a day ago at [facility], which diagnosed a superficial blood clot in her calf.  She was not started on any antiplatelet or anticoagulation medication.  She has felt otherwise well before the acute onset of pain.     This part of the medical record was transcribed by Jorge Luis Weiss Medical Scribe.     I have reviewed the Medications, Allergies, Past Medical and Surgical History, and Social History in the Epic system.    Review of Systems  A complete review of systems was performed with pertinent positives and negatives noted in the HPI, and all other systems negative.     Physical Exam   BP: 123/82  Pulse: 102  Heart Rate: 102  Temp: 97.8  F (36.6  C)  Resp: 14  Height: 167.6 cm (5' 6\")  Weight: 103.4 kg (228 lb)  SpO2: 97 %    Physical Exam  General: uncomfortable appearing. Appears stated age.   HENT: MMM, no oropharyngeal lesions  Eyes: PERRL, normal sclerae  Cardio: borderline tachycardic rate. Regular rhythm. Extremities well perfused  Resp: Normal work of breathing, clear breath sounds, taking shallow breaths  Chest/Back: no visual signs of trauma, no CVA tenderness, pain not reproduced with palpation  Abdomen: no tenderness, non-distended, no rebound, no guarding  Neuro: alert and fully oriented. CN II-XII grossly intact. " Grossly normal strength and sensation in all extremities.   MSK: no deformities.   Integumentary/Skin: no rash visualized, normal color  Psych: normal affect, normal behavior    ED Course      Procedures  Results for orders placed during the hospital encounter of 12/11/19   POC US ECHO LIMITED    Impression Limited Bedside ED Cardiac Ultrasound  PROCEDURE: PERFORMED BY: Dr. Kevin Anglin MD  INDICATIONS/SYMPTOM:  Chest Pain and Shortness of Breath  PROBE: Cardiac phased array probe  BODY LOCATION: Chest (cardiac)  FINDINGS: The ultrasound was performed utilizing the subcostal, parasternal long axis, parasternal short axis and apical 4 chamber views.  Grossly normal LV contractility. No RV dilation visualized. No pericardial effusion visualized. IVC grossly normal in diameter with < 50% respiratory variability.   INTERPRETATION:    Grossly normal LV contractility. No pericardial effusion. No RV dilation. IVC size and variability consistent with normovolemia.   IMAGE DOCUMENTATION: Images were archived to PACs system.               EKG Interpretation:      Interpreted by Kevin Anglin MD  Time reviewed: 0042  Symptoms at time of EKG: chest pain   Rhythm: normal sinus   Rate: normal  Axis: normal  Ectopy: none  Conduction: normal  ST Segments/ T Waves: No ST-T wave changes  Q Waves: none  Comparison to prior: No old EKG available    Clinical Impression: normal EKG        Critical Care Addendum    My initial assessment, based on my review of nursing observations, review of vital signs and focused history, established that Mesha Fields has high risk of pulmonary embolism and potential cardiac strain causing tachycardia, which requires immediate intervention, and therefore she is critically ill.     After the initial assessment, the care team initiated multiple lab tests and performed bedside cardiac ultrasound to provide stabilization care. Due to the critical nature of this patient, I reassessed nursing  observations, vital signs, physical exam, review of cardiac rhythm monitor, 12 lead ECG analysis, interpretation of imaging and respiratory status multiple times prior to her disposition.     Time also spent performing documentation, reviewing test results and coordination of care.     Critical care time (excluding teaching time and procedures): 45 minutes.        Labs Ordered and Resulted from Time of ED Arrival Up to the Time of Departure from the ED   BASIC METABOLIC PANEL - Abnormal; Notable for the following components:       Result Value    Glucose 140 (*)     All other components within normal limits   D DIMER QUANTITATIVE - Abnormal; Notable for the following components:    D Dimer 1.7 (*)     All other components within normal limits   TROPONIN I   CBC WITH PLATELETS DIFFERENTIAL   HCG QUALITATIVE   PLATELETS MONITORED PER HEPARIN TREATMENT PROTOCOL (FOR MEANINGFUL USE   CARDIAC CONTINUOUS MONITORING   NOTIFY PHYSICIAN   NOTIFY PHYSICIAN     US Lower Extremity Venous Duplex Bilateral   Final Result   Abnormal   IMPRESSION:   1. Nonocclusive thrombus in the right popliteal vein.   2. Nonocclusive thrombus in the left gastrocnemius vein.      [Urgent Result: Lower extremity DVT]      Finding was identified on 12/11/2019 2:34 AM.       Dr. Anglin was contacted by Dr. Srinivasan at 12/11/2019 3:44 AM and   verbalized understanding of the urgent finding.       I have personally reviewed the examination and initial interpretation   and I agree with the findings.      SANDRA MARTELL MD      CT Chest Pulmonary Embolism w Contrast   Final Result   IMPRESSION:   1.  Tiny burden of bilateral acute pulmonary emboli without evidence for right heart strain.      2.  4 mm and smaller pulmonary nodules. If no prior see guidelines below.      Results called to Dr. Anglin at 1:35 a.m. 12/11/2019.      REFERENCE:   Guidelines for Management of Incidental Pulmonary Nodules Detected on CT Images: From the Fleischner Society  2017.    Guidelines apply to incidental nodules in patients who are 35 years or older.   Guidelines do not apply to lung cancer screening, patients with immunosuppression, or patients with known primary cancer.      MULTIPLE NODULES   Nodule size < 6 mm   Low-risk patients: No follow-up needed.   High-risk patients: Optional follow-up at 12 months.      Nodule size 6 mm or larger   Low-risk patients: Follow-up CT at 3-6 months, then consider CT at 18-24 months.   High-risk patients: Follow-up CT at 3-6 months, then at 18-24 months if no change.   - Use most suspicious nodule as guide to management.      Consider referral to lung nodule clinic.         POC US ECHO LIMITED   Final Result   Limited Bedside ED Cardiac Ultrasound   PROCEDURE: PERFORMED BY: Dr. Kevin Anglin MD   INDICATIONS/SYMPTOM:  Chest Pain and Shortness of Breath   PROBE: Cardiac phased array probe   BODY LOCATION: Chest (cardiac)   FINDINGS: The ultrasound was performed utilizing the subcostal, parasternal long axis, parasternal short axis and apical 4 chamber views.   Grossly normal LV contractility. No RV dilation visualized. No pericardial effusion visualized. IVC grossly normal in diameter with < 50% respiratory variability.    INTERPRETATION:    Grossly normal LV contractility. No pericardial effusion. No RV dilation. IVC size and variability consistent with normovolemia.    IMAGE DOCUMENTATION: Images were archived to PACs system.                Assessments & Plan (with Medical Decision Making)   Patient presenting with acute onset of pleuritic chest pain and shortness of breath in the context of recent calf superficial vein thrombosis diagnosis.  Vitals in ED notable for initial mild tachycardia.  Initial differential diagnosis please not limited to PE, ACS, aortic dissection, costochondritis, gastric reflux.    This part of the medical record was transcribed by Anisha Matias Scribeugenia.     Strong initial suspicion of PE. D-dimer  positive. Cardiac ultrasoun with grossly normal LV contractility, no RV dilation nor septal bowing. DVT US demonstrated thrombus in the right popliteal veing in additon to left gastrocnemius vein. CT PA demonstrated bilaterally subsegmental PEs. Heparin drip started.  EKG shows normal sinus rhythm without evidence of acute ischemia, troponin negative - ACS unlikely.      The complete clinical picture is most consistent with bilateral pulmonary emboli. After counseling on the diagnosis, work-up, and treatment plan, the patient was admitted to medicine.       Final diagnoses:   Acute pulmonary embolism without acute cor pulmonale, unspecified pulmonary embolism type (H)   Acute chest pain       --  Kevin Anglin MD   Emergency Medicine     I have reviewed the nursing notes.  I have reviewed the findings, diagnosis, plan and need for follow up with the patient.  12/11/2019   [facility], EMERGENCY DEPARTMENT     Kevin Anglin MD  12/11/19 0556

## 2019-12-12 ENCOUNTER — OFFICE VISIT (OUTPATIENT)
Dept: FAMILY MEDICINE | Facility: CLINIC | Age: 29
End: 2019-12-12
Payer: COMMERCIAL

## 2019-12-12 VITALS
WEIGHT: 217.8 LBS | DIASTOLIC BLOOD PRESSURE: 88 MMHG | HEIGHT: 64 IN | TEMPERATURE: 98.3 F | BODY MASS INDEX: 37.18 KG/M2 | HEART RATE: 102 BPM | SYSTOLIC BLOOD PRESSURE: 122 MMHG | OXYGEN SATURATION: 95 %

## 2019-12-12 DIAGNOSIS — I26.99 ACUTE PULMONARY EMBOLISM, UNSPECIFIED PULMONARY EMBOLISM TYPE, UNSPECIFIED WHETHER ACUTE COR PULMONALE PRESENT (H): Primary | ICD-10-CM

## 2019-12-12 DIAGNOSIS — Z79.01 ANTICOAGULATED: ICD-10-CM

## 2019-12-12 ASSESSMENT — MIFFLIN-ST. JEOR: SCORE: 1692.93

## 2019-12-12 ASSESSMENT — PAIN SCALES - GENERAL: PAINLEVEL: MILD PAIN (2)

## 2019-12-12 NOTE — PATIENT INSTRUCTIONS
First, please have your INR rechecked tomorrow morning. Plan on having your labs completed every other day until we are therapeutic or your INR is between 2 and 3. Please call me if you have any questions. Thank you.   [facility] Medication Refill Request Information:  * Please contact your pharmacy regarding ANY request for medication refills.  ** NP Clinic Prescription Fax = [phone]  * Please allow 3 business days for routine medication refills.  * Please allow 5 business days for controlled substance medication refills.     [facility] Test Result notification information:  *You will be notified with in 7-10 days of your appointment day regarding the results of your test.  If you are on MyChart you will be notified as soon as the provider has reviewed the results and signed off on them.    [facility]: [phone]

## 2019-12-12 NOTE — PROGRESS NOTES
HPI       Mesha Fields is a 29 year old woman who presents with the need to bridge her Coumadin with Lovenox until her INR is therapeutic. Mesha was recently in the hospital with bilateral PE.   Please see notes from recent discharge:  Date of Admission:  2019  Date of Discharge:  2019  6:15 PM  Date of Service: 2019  Discharging Attending Provider: Dr. Feliciano  Discharge Team: [facility]  Discharge Diagnoses  Acute pulmonary embolism without acute cor pulmonale, unspecified pulmonary embolism type (H)  Acute chest pain  Follow-ups Needed After Discharge     - Daily INR while bridging from Lovenox to warfarin. Scheduled at [facility] per Care Coordinator.   - Follow-up with PCP within one week.   - Consider starting metformin for PCOS  - F/u on passing blood clots after  on   Hospital Course  Mesha Fields was admitted on 2019 for bilateral PEs following  on  at 23w3d GA for PPROM.  (Pregnancy complicated by pyelnonephritis requiring intensive care admission). The following problems were addressed during her hospitalization:  # Bilateral PEs  # DVTs  # SOB  # Chest pain  Patient presented to ED one hour after sudden onset of chest pain and SOB that did not respond to oxycodone. LE ultrasound showed bilateral DVTs and CT PE showed small bilateral PEs. Risk factors for PE include: post-partum, s/p , recent immobility, obesity. Patient started on heparin, then transitioned to and discharged on Lovenox.  Anticipate bridging for 5 days and then will be on warfarin for 3 months. [facility] (she's currently staying at [facility] while her infant is in the [facility]). Pharmacy recommended starting warfarin at 5mg daily. Advised patient lovenox and warfarin are safe medications while breastfeeding.  - Lovenox 100mg BID for 5 days  - Warfarin 5 mg daily   - Tylenol PRN for pain (avoid ibuprofen  "if possible)  Chief Complaint   Patient presents with     Anticoagulation     Pt is here to follow up on blood thinners.   Taken from recent discharge note.   Problem, Medication and Allergy Lists were reviewed and updated if needed.    Patient is an established patient of this clinic.         Review of Systems:   Review of Systems     Constitutional:  Negative for fever, chills and fatigue.   Respiratory:   Negative for shortness of breath.    Cardiovascular:  Negative for chest pain.               Physical Exam:     Vitals:    12/12/19 1449   BP: 122/88   Pulse: 102   Temp: 98.3  F (36.8  C)   SpO2: 95%   Weight: 98.8 kg (217 lb 12.8 oz)   Height: 1.626 m (5' 4\")     Body mass index is 37.39 kg/m .  Vitals were reviewed and were normal.     Physical Exam  Constitutional:       Appearance: Normal appearance.   HENT:      Head: Normocephalic.   Cardiovascular:      Rate and Rhythm: Normal rate and regular rhythm.      Pulses: Normal pulses.      Heart sounds: Normal heart sounds.   Musculoskeletal: Normal range of motion.   Skin:     General: Skin is warm and dry.   Neurological:      General: No focal deficit present.      Mental Status: She is alert and oriented to person, place, and time.   Psychiatric:         Mood and Affect: Mood normal.         Behavior: Behavior normal.         Results:     INR to be checked on 12/14/2019.    Assessment and Plan     1. Acute pulmonary embolism, unspecified pulmonary embolism type, unspecified whether acute cor pulmonale present (H)  2. Anticoagulated  There are no discontinued medications.  First, please have your INR rechecked tomorrow morning. Plan on having your labs completed at least every other day until your INR is therapeutic or between 2 and 3. Please call me if you have any questions or concerns. I will be referring you to the Coumadin clinic as well for management of your anticoagulation. Thank you. Options for treatment and follow-up care were reviewed with the " patient. Mesha Fields  engaged in the decision making process and verbalized understanding of the options discussed and agreed with the final plan.  BERTHA Villegas, CNP  Addendum 12.13/2019 1319: I talked with Laura in the coumadin clinic at [phone]. The coumadin clinic will take over Mesha's anticoagulation management as of Monday. I asked Mesha to please follow up with me on the 23rd in clinic.   BERTHA Villegas, CNP

## 2019-12-12 NOTE — PLAN OF CARE
[id number] shift:    A&Ox4. VSBARBER Gary to bedside. Pt able to discharge this evening. Lovenox and coumadin teaching provided. All questions answered. Pt brought down to shuttle for [facility].

## 2019-12-12 NOTE — NURSING NOTE
"29 year old  Chief Complaint   Patient presents with     Anticoagulation     Pt is here to follow up on blood thinners.       Blood pressure 122/88, pulse 102, temperature 98.3  F (36.8  C), height 1.626 m (5' 4\"), weight 98.8 kg (217 lb 12.8 oz), SpO2 95 %. Body mass index is 37.39 kg/m .  BP completed using cuff size:      Monika Ferrera, TOYA  December 12, 2019 2:55 PM  "

## 2019-12-12 NOTE — PLAN OF CARE
DISCHARGE                         12/11/2019  6:15 PM  ----------------------------------------------------------------------------  Discharged to: [facility] [place]   Via: shuttle from hospital  Discharge Instructions: diet, activity, medications, follow up appointments, when to call the MD, aftercare instructions.  Prescriptions: To be filled by [facility] discharge pharmacy; medication list reviewed & sent with pt  Follow Up Appointments: arranged; information given  Belongings: All sent with pt  IV: d/c'd  Telemetry: d/c'd  Pt exhibits understanding of above discharge instructions including lovenox and coumadin teaching; all questions answered. Follow up tomorrow at coumadin clinic tomorrow.     Discharge Paperwork: Signed, copied, and sent home with patient.

## 2019-12-13 DIAGNOSIS — Z79.01 ANTICOAGULATED: ICD-10-CM

## 2019-12-13 DIAGNOSIS — I26.99 ACUTE PULMONARY EMBOLISM, UNSPECIFIED PULMONARY EMBOLISM TYPE, UNSPECIFIED WHETHER ACUTE COR PULMONALE PRESENT (H): ICD-10-CM

## 2019-12-13 LAB — INR PPP: 1.05 (ref 0.86–1.14)

## 2019-12-13 RX ORDER — WARFARIN SODIUM 7.5 MG/1
7.5 TABLET ORAL DAILY
Qty: 10 TABLET | Refills: 0 | Status: SHIPPED | OUTPATIENT
Start: 2019-12-13 | End: 2020-01-09 | Stop reason: DRUGHIGH

## 2019-12-13 ASSESSMENT — ENCOUNTER SYMPTOMS
FEVER: 0
SHORTNESS OF BREATH: 0
CHILLS: 0
FATIGUE: 0

## 2019-12-16 ENCOUNTER — NURSE TRIAGE (OUTPATIENT)
Dept: NURSING | Facility: CLINIC | Age: 29
End: 2019-12-16

## 2019-12-16 ENCOUNTER — TELEPHONE (OUTPATIENT)
Dept: FAMILY MEDICINE | Facility: CLINIC | Age: 29
End: 2019-12-16

## 2019-12-16 ENCOUNTER — ANTICOAGULATION THERAPY VISIT (OUTPATIENT)
Dept: ANTICOAGULATION | Facility: CLINIC | Age: 29
End: 2019-12-16

## 2019-12-16 DIAGNOSIS — I26.99 ACUTE PULMONARY EMBOLISM, UNSPECIFIED PULMONARY EMBOLISM TYPE, UNSPECIFIED WHETHER ACUTE COR PULMONALE PRESENT (H): ICD-10-CM

## 2019-12-16 DIAGNOSIS — Z79.01 ANTICOAGULATED: ICD-10-CM

## 2019-12-16 LAB — INR PPP: 1.2 (ref 0.86–1.14)

## 2019-12-16 RX ORDER — WARFARIN SODIUM 5 MG/1
TABLET ORAL
Qty: 60 TABLET | Refills: 0 | Status: SHIPPED | OUTPATIENT
Start: 2019-12-16 | End: 2020-01-09

## 2019-12-16 NOTE — TELEPHONE ENCOUNTER
Pt reporting, she has been having some chest pain off and on for the last week.    Pt was seen in the hospital last week on 12/11/2019 for Acute pulmonary embolism.     Over the weekend Pt started having more chest pain off and on during the middle of the night.    The chest pain finally subsided.   But was worried about having some more blood clots.     Pt in stable condition at the present time.   No chest pain or shortness of breath noted. No wheezing or gasping for air.   No nausea or vomiting.   No dizziness noted.        Pt wanting to be seen in clinic.    Plan of Care  Office Visit 12/18/19 @ 9:20 AM for Pt care    Roberta Mendez RN  Central Triage Red Flags/Med Refills          Additional Information    Negative: Severe difficulty breathing (e.g., struggling for each breath, speaks in single words)    Negative: Passed out (i.e., fainted, collapsed and was not responding)    Negative: Chest pain lasting longer than 5 minutes and ANY of the following:* Over 50 years old* Over 30 years old and at least one cardiac risk factor (i.e., high blood pressure, diabetes, high cholesterol, obesity, smoker or strong family history of heart disease)* Pain is crushing, pressure-like, or heavy * Took nitroglycerin and chest pain was not relieved* History of heart disease (i.e., angina, heart attack, bypass surgery, angioplasty, CHF)    Negative: Visible sweat on face or sweat dripping down face    Negative: Sounds like a life-threatening emergency to the triager    Negative: Followed an injury to chest    Negative: SEVERE chest pain    Negative: Pain also present in shoulder(s) or arm(s) or jaw    Negative: Difficulty breathing    Negative: Cocaine use within last 3 days    Negative: History of prior 'blood clot' in leg or lungs (i.e., deep vein thrombosis, pulmonary embolism)    Negative: Recent illness requiring prolonged bed rest (i.e., immobilization)    Negative: Hip or leg fracture in past 2 months (e.g, or had cast on  leg or ankle)    Negative: Major surgery in the past month    Negative: Recent long-distance travel with prolonged time in car, bus, plane, or train (i.e., within past 2 weeks; 6 or more hours duration)    Negative: Heart beating irregularly or very rapidly    Negative: Chest pain lasting longer than 5 minutes    Negative: Intermittent chest pain and pain has been increasing in severity or frequency    Negative: Dizziness or lightheadedness    Negative: Coughing up blood    Negative: Patient sounds very sick or weak to the triager    Negative: Fever > 100.5 F (38.1 C)    Negative: Intermittent chest pains persist > 3 days    Negative: All other patients with chest pain    Patient wants to be seen    Protocols used: CHEST PAIN-A-OH       no diabetes and no thyroid trouble.

## 2019-12-16 NOTE — PROGRESS NOTES
ANTICOAGULATION FOLLOW-UP CLINIC VISIT    Patient Name:  Mesha Fields  Date:  2019  Contact Type:  Telephone    SUBJECTIVE:  Patient Findings     Comments:   See calender for reported warfarin dosing by patient.  Instructions were given to continue with Lovenox 100mg BID. Plan today is discussed with Emili Perry.         Clinical Outcomes     Comments:   See calender for reported warfarin dosing by patient.  Instructions were given to continue with Lovenox 100mg BID. Plan today is discussed with Emili Perry.            OBJECTIVE    INR   Date Value Ref Range Status   2019 1.20 (H) 0.86 - 1.14 Final       ASSESSMENT / PLAN  No question data found.  Anticoagulation Summary  As of 2019    INR goal:   2.0-3.0   TTR:   --   INR used for dosin.20! (2019)   Warfarin maintenance plan:   No maintenance plan   Full warfarin instructions:   : 10 mg; : 7.5 mg; Otherwise No maintenance plan   Next INR check:   2019   Target end date:   2019    Indications    Anticoagulated [Z79.01]  Acute pulmonary embolism  unspecified pulmonary embolism type  unspecified whether acute cor pulmonale present (H) [I26.99]             Anticoagulation Episode Summary     INR check location:       Preferred lab:       Send INR reminders to:   [facility]    Comments:   Emili Perry NP contact #[phone] cell [phone]      Anticoagulation Care Providers     Provider Role Specialty Phone number    Emili Perry NP Referring [facility] [phone]            See the Encounter Report to view Anticoagulation Flowsheet and Dosing Calendar (Go to Encounters tab in chart review, and find the Anticoagulation Therapy Visit)    Spoke with patient.    Laura Ayers RN

## 2019-12-18 ENCOUNTER — OFFICE VISIT (OUTPATIENT)
Dept: FAMILY MEDICINE | Facility: CLINIC | Age: 29
End: 2019-12-18
Payer: COMMERCIAL

## 2019-12-18 ENCOUNTER — ANTICOAGULATION THERAPY VISIT (OUTPATIENT)
Dept: ANTICOAGULATION | Facility: CLINIC | Age: 29
End: 2019-12-18

## 2019-12-18 VITALS
BODY MASS INDEX: 37.05 KG/M2 | OXYGEN SATURATION: 99 % | HEART RATE: 98 BPM | SYSTOLIC BLOOD PRESSURE: 123 MMHG | HEIGHT: 64 IN | WEIGHT: 217 LBS | DIASTOLIC BLOOD PRESSURE: 84 MMHG | TEMPERATURE: 97.5 F

## 2019-12-18 DIAGNOSIS — G47.09 OTHER INSOMNIA: Primary | ICD-10-CM

## 2019-12-18 DIAGNOSIS — F32.1 MODERATE MAJOR DEPRESSION (H): ICD-10-CM

## 2019-12-18 DIAGNOSIS — I26.99 ACUTE PULMONARY EMBOLISM, UNSPECIFIED PULMONARY EMBOLISM TYPE, UNSPECIFIED WHETHER ACUTE COR PULMONALE PRESENT (H): ICD-10-CM

## 2019-12-18 DIAGNOSIS — R07.89 CHEST DISCOMFORT: ICD-10-CM

## 2019-12-18 DIAGNOSIS — Z79.01 ANTICOAGULATED: ICD-10-CM

## 2019-12-18 LAB
INR PPP: 1.59 (ref 0.86–1.14)
PHQ9 SCORE: 20

## 2019-12-18 RX ORDER — SERTRALINE HYDROCHLORIDE 100 MG/1
100 TABLET, FILM COATED ORAL DAILY
Qty: 30 TABLET | Refills: 0 | Status: SHIPPED | OUTPATIENT
Start: 2019-12-18

## 2019-12-18 ASSESSMENT — ENCOUNTER SYMPTOMS
INSOMNIA: 1
FEVER: 0
CHILLS: 0
DEPRESSION: 1
FATIGUE: 1

## 2019-12-18 ASSESSMENT — MIFFLIN-ST. JEOR: SCORE: 1689.56

## 2019-12-18 ASSESSMENT — PAIN SCALES - GENERAL: PAINLEVEL: MILD PAIN (3)

## 2019-12-18 NOTE — NURSING NOTE
Chief Complaint   Patient presents with     Recheck Medication     patient is following up on chest pain, not currently having the pain       Ruchi Bonilla EMT at 9:23 AM on 12/18/2019.

## 2019-12-18 NOTE — PROGRESS NOTES
"       HPI       Mesha Fields is a 29 year old woman who presents with concerns about chest pain with a known PE. Mesha developed bilateral PE after a C section. She has been taking Coumadin and Lovenox, her INR is not yet therapeutic, she is due to have her INR checked again today. She \"woke up to chest pain\" 3 days ago. Mesha is also feeling \"very emotional.\"  She does not feel like she can handle her emotions. She feels like her symptoms could be related to post partum depression. Her baby girl born at 23 weeks is in the N[facility].   Chief Complaint   Patient presents with     Recheck Medication     patient is following up on chest pain, not currently having the pain     Problem, Medication and Allergy Lists were reviewed and updated if needed.    Patient is an established patient of this clinic.         Review of Systems:   Review of Systems     Constitutional:  Positive for fatigue. Negative for fever and chills.   Cardiovascular:  Positive for chest pain.   Psychiatric/Behavioral:  Positive for depression.                Physical Exam:     Vitals:    12/18/19 0924   BP: 123/84   BP Location: Right arm   Patient Position: Sitting   Cuff Size: Adult Large   Pulse: 98   Temp: 97.5  F (36.4  C)   TempSrc: Oral   SpO2: 99%   Weight: 98.4 kg (217 lb)   Height: 1.626 m (5' 4.02\")     Body mass index is 37.23 kg/m .  Vitals were reviewed and were normal.     Physical Exam  Constitutional:       Appearance: Normal appearance.   HENT:      Head: Normocephalic.   Cardiovascular:      Rate and Rhythm: Normal rate and regular rhythm.      Pulses: Normal pulses.      Heart sounds: Normal heart sounds.   Pulmonary:      Effort: Pulmonary effort is normal.      Breath sounds: Normal breath sounds.   Musculoskeletal: Normal range of motion.   Skin:     General: Skin is warm and dry.             Comments: C section scar is CDI, without s/s infection.    Neurological:      General: No focal deficit present.    "   Mental Status: She is alert and oriented to person, place, and time.   Psychiatric:         Mood and Affect: Mood normal.         Behavior: Behavior normal.         Results:     INR due today.     Assessment and Plan     1. Other insomnia    2. Acute pulmonary embolism, unspecified pulmonary embolism type, unspecified whether acute cor pulmonale present (H)    3. Chest discomfort    4. Moderate major depression    There are no discontinued medications.  Total time spent 25 minutes.  More than 50% of the time spent with patient on counseling / coordinating her care.First, increase Zoloft to 100 mg daily. Next, have your INR today. Next, if your pain returns, please present to the ER. Next, return on 12/23/2019.Options for treatment and follow-up care were reviewed with the patient. Mesha Fields engaged in the decision making process and verbalized understanding of the options discussed and agreed with the final plan.  Emili Perry, APRN, CNP

## 2019-12-18 NOTE — PATIENT INSTRUCTIONS

## 2019-12-18 NOTE — PROGRESS NOTES
ANTICOAGULATION FOLLOW-UP CLINIC VISIT    Patient Name:  Mesha Fields  Date:  2019  Contact Type:  Telephone    SUBJECTIVE:  Patient Findings     Comments:   lovenox continues, 100 mg BID.  End date for therapy is changed to: 3/13/20 as the referral indicates coumadin for 3 months.  Dosing plan discussed with [facility]         Clinical Outcomes     Comments:   lovenox continues, 100 mg BID.  End date for therapy is changed to: 3/13/20 as the referral indicates coumadin for 3 months.  Dosing plan discussed with [facility]            OBJECTIVE    INR   Date Value Ref Range Status   2019 1.59 (H) 0.86 - 1.14 Final       ASSESSMENT / PLAN  INR assessment SUB    Recheck INR In: 2 DAYS    INR Location Clinic      Anticoagulation Summary  As of 2019    INR goal:   2.0-3.0   TTR:   --   INR used for dosin.59! (2019)   Warfarin maintenance plan:   No maintenance plan   Full warfarin instructions:   : 10 mg; : 10 mg; Otherwise No maintenance plan   Next INR check:   2019   Priority:   Critical   Target end date:   3/13/2020    Indications    Anticoagulated [Z79.01]  Acute pulmonary embolism  unspecified pulmonary embolism type  unspecified whether acute cor pulmonale present (H) [I26.99]             Anticoagulation Episode Summary     INR check location:       Preferred lab:       Send INR reminders to:   [facility]    Comments:   Emili Perry NP contact #[phone] cell [phone] +++stop date for coumadin: 3/13/20+++      Anticoagulation Care Providers     Provider Role Specialty Phone number    Emili Perry NP Referring [facility] [phone]            See the Encounter Report to view Anticoagulation Flowsheet and Dosing Calendar (Go to Encounters tab in chart review, and find the Anticoagulation Therapy Visit)    Spoke with the pt - see above notation     Loreto Humphries RN

## 2019-12-20 ENCOUNTER — ANTICOAGULATION THERAPY VISIT (OUTPATIENT)
Dept: ANTICOAGULATION | Facility: CLINIC | Age: 29
End: 2019-12-20

## 2019-12-20 DIAGNOSIS — I26.99 ACUTE PULMONARY EMBOLISM, UNSPECIFIED PULMONARY EMBOLISM TYPE, UNSPECIFIED WHETHER ACUTE COR PULMONALE PRESENT (H): ICD-10-CM

## 2019-12-20 DIAGNOSIS — Z79.01 ANTICOAGULATED: ICD-10-CM

## 2019-12-20 LAB — INR PPP: 2.08 (ref 0.86–1.14)

## 2019-12-20 NOTE — PROGRESS NOTES
ANTICOAGULATION FOLLOW-UP CLINIC VISIT    Patient Name:  Mesha Fields  Date:  2019  Contact Type:  Telephone    SUBJECTIVE:  Patient Findings     Comments:   Discontinue Lovenox 100mg BID        Clinical Outcomes     Negatives:   Major bleeding event, Thromboembolic event, Anticoagulation-related hospital admission, Anticoagulation-related ED visit, Anticoagulation-related fatality    Comments:   Discontinue Lovenox 100mg BID           OBJECTIVE    INR   Date Value Ref Range Status   2019 2.08 (H) 0.86 - 1.14 Final       ASSESSMENT / PLAN  INR assessment THER    Recheck INR In: 3 DAYS    INR Location Clinic      Anticoagulation Summary  As of 2019    INR goal:   2.0-3.0   TTR:   --   INR used for dosin.08 (2019)   Warfarin maintenance plan:   No maintenance plan   Full warfarin instructions:   : 7.5 mg; : 7.5 mg; : 7.5 mg; Otherwise No maintenance plan   Next INR check:   2019   Priority:   Critical   Target end date:   3/13/2020    Indications    Anticoagulated [Z79.01]  Acute pulmonary embolism  unspecified pulmonary embolism type  unspecified whether acute cor pulmonale present (H) [I26.99]             Anticoagulation Episode Summary     INR check location:       Preferred lab:       Send INR reminders to:   [facility]    Comments:   Emili Perry NP contact #[phone] cell [phone] +++stop date for coumadin: 3/13/20+++      Anticoagulation Care Providers     Provider Role Specialty Phone number    Emili Perry NP Referring [facility] [phone]            See the Encounter Report to view Anticoagulation Flowsheet and Dosing Calendar (Go to Encounters tab in chart review, and find the Anticoagulation Therapy Visit)    Spoke with patient. Gave them their lab results and new warfarin recommendation.  No changes in health, medication, or diet. No missed doses, no falls. No signs or symptoms of bleed or clotting.     Nesha Mclean RN

## 2019-12-23 ENCOUNTER — ANTICOAGULATION THERAPY VISIT (OUTPATIENT)
Dept: ANTICOAGULATION | Facility: CLINIC | Age: 29
End: 2019-12-23

## 2019-12-23 DIAGNOSIS — I26.99 ACUTE PULMONARY EMBOLISM, UNSPECIFIED PULMONARY EMBOLISM TYPE, UNSPECIFIED WHETHER ACUTE COR PULMONALE PRESENT (H): ICD-10-CM

## 2019-12-23 DIAGNOSIS — Z79.01 ANTICOAGULATED: ICD-10-CM

## 2019-12-23 LAB — INR PPP: 2.01 (ref 0.86–1.14)

## 2019-12-23 NOTE — PROGRESS NOTES
ANTICOAGULATION FOLLOW-UP CLINIC VISIT    Patient Name:  Mesha Fields  Date:  2019  Contact Type:  Telephone    SUBJECTIVE:  Patient Findings     Positives:   Change in health (Feels like she may be getting a cold.), Other complaints (Feeling anxious today.)             OBJECTIVE    INR   Date Value Ref Range Status   2019 2.01 (H) 0.86 - 1.14 Final       ASSESSMENT / PLAN  INR assessment THER    Recheck INR In: 4 DAYS    INR Location Clinic      Anticoagulation Summary  As of 2019    INR goal:   2.0-3.0   TTR:   --   INR used for dosin.01 (2019)   Warfarin maintenance plan:   No maintenance plan   Full warfarin instructions:   : 10 mg; : 7.5 mg; : 7.5 mg; : 7.5 mg; Otherwise No maintenance plan   Next INR check:   2019   Priority:   Critical   Target end date:   3/13/2020    Indications    Anticoagulated [Z79.01]  Acute pulmonary embolism  unspecified pulmonary embolism type  unspecified whether acute cor pulmonale present (H) [I26.99]             Anticoagulation Episode Summary     INR check location:       Preferred lab:       Send INR reminders to:    [facility]    Comments:   Emili ePrry NP contact #[phone] cell [phone] +++stop date for coumadin: 3/13/20+++      Anticoagulation Care Providers     Provider Role Specialty Phone number    Emili Perry NP Referring [facility] [phone]            See the Encounter Report to view Anticoagulation Flowsheet and Dosing Calendar (Go to Encounters tab in chart review, and find the Anticoagulation Therapy Visit)  Spoke with patient.    Naomi Byers RN

## 2019-12-26 ENCOUNTER — APPOINTMENT (OUTPATIENT)
Dept: CT IMAGING | Facility: CLINIC | Age: 29
End: 2019-12-26
Attending: EMERGENCY MEDICINE
Payer: COMMERCIAL

## 2019-12-26 ENCOUNTER — APPOINTMENT (OUTPATIENT)
Dept: CARDIOLOGY | Facility: CLINIC | Age: 29
End: 2019-12-26
Attending: NURSE PRACTITIONER
Payer: COMMERCIAL

## 2019-12-26 ENCOUNTER — ANTICOAGULATION THERAPY VISIT (OUTPATIENT)
Dept: ANTICOAGULATION | Facility: CLINIC | Age: 29
End: 2019-12-26

## 2019-12-26 ENCOUNTER — HOSPITAL ENCOUNTER (OUTPATIENT)
Facility: CLINIC | Age: 29
Setting detail: OBSERVATION
Discharge: HOME | End: 2019-12-26
Attending: EMERGENCY MEDICINE | Admitting: EMERGENCY MEDICINE
Payer: COMMERCIAL

## 2019-12-26 VITALS
WEIGHT: 217 LBS | TEMPERATURE: 98.2 F | HEART RATE: 86 BPM | BODY MASS INDEX: 37.05 KG/M2 | OXYGEN SATURATION: 98 % | HEIGHT: 64 IN | SYSTOLIC BLOOD PRESSURE: 101 MMHG | RESPIRATION RATE: 16 BRPM | DIASTOLIC BLOOD PRESSURE: 62 MMHG

## 2019-12-26 DIAGNOSIS — Z79.01 ANTICOAGULATED: ICD-10-CM

## 2019-12-26 DIAGNOSIS — R07.81 PLEURITIC CHEST PAIN: ICD-10-CM

## 2019-12-26 DIAGNOSIS — R07.9 ACUTE CHEST PAIN: ICD-10-CM

## 2019-12-26 DIAGNOSIS — I26.99 ACUTE PULMONARY EMBOLISM, UNSPECIFIED PULMONARY EMBOLISM TYPE, UNSPECIFIED WHETHER ACUTE COR PULMONALE PRESENT (H): ICD-10-CM

## 2019-12-26 LAB
ANION GAP SERPL CALCULATED.3IONS-SCNC: 6 MMOL/L (ref 3–14)
BASOPHILS # BLD AUTO: 0 10E9/L (ref 0–0.2)
BASOPHILS NFR BLD AUTO: 0.6 %
BUN SERPL-MCNC: 23 MG/DL (ref 7–30)
CALCIUM SERPL-MCNC: 8.7 MG/DL (ref 8.5–10.1)
CHLORIDE SERPL-SCNC: 108 MMOL/L (ref 94–109)
CO2 SERPL-SCNC: 26 MMOL/L (ref 20–32)
CREAT SERPL-MCNC: 0.78 MG/DL (ref 0.52–1.04)
D DIMER PPP FEU-MCNC: <0.3 UG/ML FEU (ref 0–0.5)
DIFFERENTIAL METHOD BLD: NORMAL
EOSINOPHIL # BLD AUTO: 0.2 10E9/L (ref 0–0.7)
EOSINOPHIL NFR BLD AUTO: 3.5 %
ERYTHROCYTE [DISTWIDTH] IN BLOOD BY AUTOMATED COUNT: 12.6 % (ref 10–15)
GFR SERPL CREATININE-BSD FRML MDRD: >90 ML/MIN/{1.73_M2}
GLUCOSE SERPL-MCNC: 92 MG/DL (ref 70–99)
HCG SERPL QL: NEGATIVE
HCT VFR BLD AUTO: 42.3 % (ref 35–47)
HGB BLD-MCNC: 14 G/DL (ref 11.7–15.7)
IMM GRANULOCYTES # BLD: 0 10E9/L (ref 0–0.4)
IMM GRANULOCYTES NFR BLD: 0.3 %
INR PPP: 2.29 (ref 0.86–1.14)
INTERPRETATION ECG - MUSE: NORMAL
LYMPHOCYTES # BLD AUTO: 2.1 10E9/L (ref 0.8–5.3)
LYMPHOCYTES NFR BLD AUTO: 31.1 %
MCH RBC QN AUTO: 30.6 PG (ref 26.5–33)
MCHC RBC AUTO-ENTMCNC: 33.1 G/DL (ref 31.5–36.5)
MCV RBC AUTO: 92 FL (ref 78–100)
MONOCYTES # BLD AUTO: 0.5 10E9/L (ref 0–1.3)
MONOCYTES NFR BLD AUTO: 7.4 %
NEUTROPHILS # BLD AUTO: 3.8 10E9/L (ref 1.6–8.3)
NEUTROPHILS NFR BLD AUTO: 57.1 %
NRBC # BLD AUTO: 0 10*3/UL
NRBC BLD AUTO-RTO: 0 /100
PLATELET # BLD AUTO: 283 10E9/L (ref 150–450)
POTASSIUM SERPL-SCNC: 4.1 MMOL/L (ref 3.4–5.3)
RBC # BLD AUTO: 4.58 10E12/L (ref 3.8–5.2)
SODIUM SERPL-SCNC: 140 MMOL/L (ref 133–144)
TROPONIN I SERPL-MCNC: <0.015 UG/L (ref 0–0.04)
TROPONIN I SERPL-MCNC: <0.015 UG/L (ref 0–0.04)
WBC # BLD AUTO: 6.6 10E9/L (ref 4–11)

## 2019-12-26 PROCEDURE — 25500064 ZZH RX 255 OP 636: Performed by: INTERNAL MEDICINE

## 2019-12-26 PROCEDURE — 25000128 H RX IP 250 OP 636: Performed by: INTERNAL MEDICINE

## 2019-12-26 PROCEDURE — 25000132 ZZH RX MED GY IP 250 OP 250 PS 637: Performed by: NURSE PRACTITIONER

## 2019-12-26 PROCEDURE — 84484 ASSAY OF TROPONIN QUANT: CPT | Performed by: NURSE PRACTITIONER

## 2019-12-26 PROCEDURE — 93321 DOPPLER ECHO F-UP/LMTD STD: CPT | Mod: 26 | Performed by: INTERNAL MEDICINE

## 2019-12-26 PROCEDURE — 93005 ELECTROCARDIOGRAM TRACING: CPT | Performed by: EMERGENCY MEDICINE

## 2019-12-26 PROCEDURE — G0378 HOSPITAL OBSERVATION PER HR: HCPCS

## 2019-12-26 PROCEDURE — 25000125 ZZHC RX 250: Performed by: EMERGENCY MEDICINE

## 2019-12-26 PROCEDURE — 84703 CHORIONIC GONADOTROPIN ASSAY: CPT | Performed by: EMERGENCY MEDICINE

## 2019-12-26 PROCEDURE — 80048 BASIC METABOLIC PNL TOTAL CA: CPT | Performed by: EMERGENCY MEDICINE

## 2019-12-26 PROCEDURE — 85379 FIBRIN DEGRADATION QUANT: CPT | Performed by: EMERGENCY MEDICINE

## 2019-12-26 PROCEDURE — 25000132 ZZH RX MED GY IP 250 OP 250 PS 637: Performed by: EMERGENCY MEDICINE

## 2019-12-26 PROCEDURE — 93018 CV STRESS TEST I&R ONLY: CPT | Performed by: INTERNAL MEDICINE

## 2019-12-26 PROCEDURE — 99226 ZZC SUBSEQUENT OBSERVATION CARE,LEVEL III: CPT | Mod: 25 | Performed by: NURSE PRACTITIONER

## 2019-12-26 PROCEDURE — 71275 CT ANGIOGRAPHY CHEST: CPT

## 2019-12-26 PROCEDURE — 93016 CV STRESS TEST SUPVJ ONLY: CPT | Performed by: INTERNAL MEDICINE

## 2019-12-26 PROCEDURE — 25000128 H RX IP 250 OP 636: Performed by: EMERGENCY MEDICINE

## 2019-12-26 PROCEDURE — 99285 EMERGENCY DEPT VISIT HI MDM: CPT | Mod: 25 | Performed by: EMERGENCY MEDICINE

## 2019-12-26 PROCEDURE — 93350 STRESS TTE ONLY: CPT | Mod: 26 | Performed by: INTERNAL MEDICINE

## 2019-12-26 PROCEDURE — 85610 PROTHROMBIN TIME: CPT | Performed by: EMERGENCY MEDICINE

## 2019-12-26 PROCEDURE — 40000264 ECHO STRESS ECHOCARDIOGRAM

## 2019-12-26 PROCEDURE — 85025 COMPLETE CBC W/AUTO DIFF WBC: CPT | Performed by: EMERGENCY MEDICINE

## 2019-12-26 PROCEDURE — 93010 ELECTROCARDIOGRAM REPORT: CPT | Mod: Z6 | Performed by: EMERGENCY MEDICINE

## 2019-12-26 PROCEDURE — 84484 ASSAY OF TROPONIN QUANT: CPT | Performed by: EMERGENCY MEDICINE

## 2019-12-26 PROCEDURE — 93325 DOPPLER ECHO COLOR FLOW MAPG: CPT | Mod: 26 | Performed by: INTERNAL MEDICINE

## 2019-12-26 PROCEDURE — 25000125 ZZHC RX 250: Performed by: INTERNAL MEDICINE

## 2019-12-26 RX ORDER — PANTOPRAZOLE SODIUM 40 MG/1
40 TABLET, DELAYED RELEASE ORAL
Status: DISCONTINUED | OUTPATIENT
Start: 2019-12-26 | End: 2019-12-26 | Stop reason: HOSPADM

## 2019-12-26 RX ORDER — LIDOCAINE 40 MG/G
CREAM TOPICAL
Status: DISCONTINUED | OUTPATIENT
Start: 2019-12-26 | End: 2019-12-26 | Stop reason: HOSPADM

## 2019-12-26 RX ORDER — ACETAMINOPHEN 325 MG/1
650 TABLET ORAL EVERY 4 HOURS PRN
Status: DISCONTINUED | OUTPATIENT
Start: 2019-12-26 | End: 2019-12-26 | Stop reason: HOSPADM

## 2019-12-26 RX ORDER — DOBUTAMINE HYDROCHLORIDE 200 MG/100ML
10-50 INJECTION INTRAVENOUS CONTINUOUS
Status: ACTIVE | OUTPATIENT
Start: 2019-12-26 | End: 2019-12-26

## 2019-12-26 RX ORDER — SERTRALINE HYDROCHLORIDE 100 MG/1
100 TABLET, FILM COATED ORAL DAILY
Status: DISCONTINUED | OUTPATIENT
Start: 2019-12-26 | End: 2019-12-26 | Stop reason: HOSPADM

## 2019-12-26 RX ORDER — NALOXONE HYDROCHLORIDE 0.4 MG/ML
.1-.4 INJECTION, SOLUTION INTRAMUSCULAR; INTRAVENOUS; SUBCUTANEOUS
Status: DISCONTINUED | OUTPATIENT
Start: 2019-12-26 | End: 2019-12-26 | Stop reason: HOSPADM

## 2019-12-26 RX ORDER — NITROGLYCERIN 0.4 MG/1
0.4 TABLET SUBLINGUAL EVERY 5 MIN PRN
Status: DISCONTINUED | OUTPATIENT
Start: 2019-12-26 | End: 2019-12-26 | Stop reason: HOSPADM

## 2019-12-26 RX ORDER — METOPROLOL TARTRATE 1 MG/ML
1-20 INJECTION, SOLUTION INTRAVENOUS
Status: ACTIVE | OUTPATIENT
Start: 2019-12-26 | End: 2019-12-26

## 2019-12-26 RX ORDER — WARFARIN SODIUM 7.5 MG/1
7.5 TABLET ORAL
Status: DISCONTINUED | OUTPATIENT
Start: 2019-12-26 | End: 2019-12-26 | Stop reason: HOSPADM

## 2019-12-26 RX ORDER — IOPAMIDOL 755 MG/ML
90 INJECTION, SOLUTION INTRAVASCULAR ONCE
Status: COMPLETED | OUTPATIENT
Start: 2019-12-26 | End: 2019-12-26

## 2019-12-26 RX ORDER — ACETAMINOPHEN 650 MG/1
650 SUPPOSITORY RECTAL EVERY 4 HOURS PRN
Status: DISCONTINUED | OUTPATIENT
Start: 2019-12-26 | End: 2019-12-26 | Stop reason: HOSPADM

## 2019-12-26 RX ORDER — ATROPINE SULFATE 0.4 MG/ML
.2-2 AMPUL (ML) INJECTION
Status: COMPLETED | OUTPATIENT
Start: 2019-12-26 | End: 2019-12-26

## 2019-12-26 RX ORDER — ASPIRIN 81 MG/1
81 TABLET ORAL DAILY
Status: DISCONTINUED | OUTPATIENT
Start: 2019-12-27 | End: 2019-12-26 | Stop reason: HOSPADM

## 2019-12-26 RX ORDER — ALUMINA, MAGNESIA, AND SIMETHICONE 2400; 2400; 240 MG/30ML; MG/30ML; MG/30ML
30 SUSPENSION ORAL EVERY 4 HOURS PRN
Status: DISCONTINUED | OUTPATIENT
Start: 2019-12-26 | End: 2019-12-26 | Stop reason: HOSPADM

## 2019-12-26 RX ORDER — ASPIRIN 81 MG/1
324 TABLET, CHEWABLE ORAL ONCE
Status: COMPLETED | OUTPATIENT
Start: 2019-12-26 | End: 2019-12-26

## 2019-12-26 RX ADMIN — ATROPINE SULFATE 0.2 MG: 0.4 INJECTION, SOLUTION INTRAMUSCULAR; INTRAVENOUS; SUBCUTANEOUS at 11:33

## 2019-12-26 RX ADMIN — IOPAMIDOL 90 ML: 755 INJECTION, SOLUTION INTRAVENOUS at 06:33

## 2019-12-26 RX ADMIN — PERFLUTREN 6 ML: 6.52 INJECTION, SUSPENSION INTRAVENOUS at 11:36

## 2019-12-26 RX ADMIN — ASPIRIN 81 MG CHEWABLE TABLET 324 MG: 81 TABLET CHEWABLE at 05:30

## 2019-12-26 RX ADMIN — METOPROLOL TARTRATE 2 MG: 5 INJECTION INTRAVENOUS at 11:39

## 2019-12-26 RX ADMIN — LIDOCAINE HYDROCHLORIDE 30 ML: 20 SOLUTION ORAL; TOPICAL at 05:30

## 2019-12-26 RX ADMIN — DOBUTAMINE HYDROCHLORIDE 10 MCG/KG/MIN: 200 INJECTION INTRAVENOUS at 11:26

## 2019-12-26 RX ADMIN — PANTOPRAZOLE SODIUM 40 MG: 40 TABLET, DELAYED RELEASE ORAL at 10:43

## 2019-12-26 RX ADMIN — SERTRALINE HYDROCHLORIDE 100 MG: 100 TABLET ORAL at 10:43

## 2019-12-26 ASSESSMENT — MIFFLIN-ST. JEOR: SCORE: 1689.31

## 2019-12-26 NOTE — PROGRESS NOTES
ANTICOAGULATION FOLLOW-UP CLINIC VISIT    Patient Name:  Mesha Fields  Date:  2019  Contact Type:  Telephone    SUBJECTIVE:  Patient Findings     Comments:   Patient was in observation today for chest pain.  Patient was released without complication.         Clinical Outcomes     Comments:   Patient was in observation today for chest pain.  Patient was released without complication.            OBJECTIVE    INR   Date Value Ref Range Status   2019 2.29 (H) 0.86 - 1.14 Final       ASSESSMENT / PLAN  No question data found.  Anticoagulation Summary  As of 2019    INR goal:   2.0-3.0   TTR:   100.0 % (3 d)   INR used for dosin.29 (2019)   Warfarin maintenance plan:   No maintenance plan   Full warfarin instructions:   : 10 mg; : 7.5 mg; : 7.5 mg; : 7.5 mg; : 10 mg; : 7.5 mg; : 7.5 mg; Otherwise No maintenance plan   Next INR check:   2020   Priority:   Critical   Target end date:   3/13/2020    Indications    Anticoagulated [Z79.01]  Acute pulmonary embolism  unspecified pulmonary embolism type  unspecified whether acute cor pulmonale present (H) [I26.99]             Anticoagulation Episode Summary     INR check location:       Preferred lab:       Send INR reminders to:   [facility]    Comments:   Emili Perry NP contact #[phone] cell [phone] +++stop date for coumadin: 3/13/20+++      Anticoagulation Care Providers     Provider Role Specialty Phone number    Emili Perry NP Referring [facility] [phone]            See the Encounter Report to view Anticoagulation Flowsheet and Dosing Calendar (Go to Encounters tab in chart review, and find the Anticoagulation Therapy Visit)    Left message for patient with results and dosing recommendations. Asked patient to call back to report any missed doses, falls, signs and symptoms of bleeding or clotting, any changes in health, medication, or diet. Asked patient to call back with any  questions or concerns.     Laura Ayers, RN

## 2019-12-26 NOTE — ED NOTES
[facility] [place]   ED Nurse to Floor Handoff     Mesha Fields is a 29 year old female who speaks English and lives alone,  [facility]  They arrived in the ED by ambulance from home    ED Chief Complaint: Shortness of Breath    ED Dx;   Final diagnoses:   Pleuritic chest pain   Acute chest pain         Needed?: No    Allergies: No Known Allergies.  Past Medical Hx:   Past Medical History:   Diagnosis Date     Anxiety      PCOS (polycystic ovarian syndrome)       Baseline Mental status: WDL  Current Mental Status changes: at basesline    Infection present or suspected this encounter: no  Sepsis suspected: No  Isolation type: No active isolations     Activity level - Baseline/Home:  Independent  Activity Level - Current:   Independent    Bariatric equipment needed?: No    In the ED these meds were given:   Medications   aspirin (ASA) chewable tablet 324 mg (324 mg Oral Given 12/26/19 0530)   lidocaine (XYLOCAINE) 2 % 15 mL, alum & mag hydroxide-simethicone (MYLANTA ES/MAALOX  ES) 15 mL GI Cocktail (30 mLs Oral Given 12/26/19 0530)   iopamidol (ISOVUE-370) solution 90 mL (90 mLs Intravenous Given 12/26/19 0633)   sodium chloride (PF) 0.9% PF flush 99 mL (99 mLs Intravenous Given 12/26/19 0633)       Drips running?  No    Home pump  No    Current LDAs  Peripheral IV 12/26/19 Right Upper forearm (Active)   Site Assessment WDL 12/26/2019  5:25 AM   Line Status Saline locked 12/26/2019  5:25 AM   Phlebitis Scale 0-->no symptoms 12/26/2019  5:25 AM   Infiltration Scale 0 12/26/2019  5:25 AM   Infiltration Site Treatment Method  None 12/26/2019  5:25 AM   Extravasation? No 12/26/2019  5:25 AM   Number of days: 0       Incision/Surgical Site 12/11/19 Anterior Pelvis (Active)   Number of days: 15       Labs results:   Labs Ordered and Resulted from Time of ED Arrival Up to the Time of Departure from the ED   INR - Abnormal; Notable for the following  "components:       Result Value    INR 2.29 (*)     All other components within normal limits   TROPONIN I   CBC WITH PLATELETS DIFFERENTIAL   BASIC METABOLIC PANEL   D DIMER QUANTITATIVE   HCG QUALITATIVE   CARDIAC CONTINUOUS MONITORING       Imaging Studies:   Recent Results (from the past 24 hour(s))   CT Chest Pulmonary Embolism w Contrast    Narrative    EXAM: CT CHEST PULMONARY EMBOLISM W CONTRAST  LOCATION: [facility]  DATE/TIME: 12/26/2019 6:39 AM    INDICATION: Acute pleuritic chest pain, history of PE  COMPARISON: 12/11/2019  TECHNIQUE: CT angiogram chest during arterial phase injection IV contrast. 2D and 3D MIP reconstructions were performed by the CT technologist. Dose reduction techniques were used.   CONTRAST: 90 mL of Isovue-370    FINDINGS:  ANGIOGRAM CHEST: Diffusely seen pulmonary emboli have resolved. Thoracic aorta is negative for dissection. No CT evidence of right heart strain.    LUNGS AND PLEURA: Scattered small pulmonary nodules, the largest measuring up to 4 mm, unchanged, likely reflecting granulomata.    MEDIASTINUM/AXILLAE: Normal. Residual thymus.    UPPER ABDOMEN: Normal.    MUSCULOSKELETAL: Normal.      Impression    IMPRESSION:  1.  Resolution of previously seen largely subsegmental pulmonary emboli. No acute pulmonary embolus.  2.  No CT findings to explain patient's reported pleuritic chest pain       Recent vital signs:   /68   Pulse 78   Temp 98.5  F (36.9  C) (Oral)   Resp 16   Ht 1.626 m (5' 4\")   Wt 98.4 kg (217 lb)   LMP  (LMP Unknown)   SpO2 96%   BMI 37.25 kg/m      Lagrange Coma Scale Score: 15 (12/26/19 0520)       Cardiac Rhythm: Normal Sinus  Pt needs tele? No - ordered for ER only at this time  Skin/wound Issues: None    Code Status: Full Code    Pain control: good    Nausea control: pt had none    Abnormal labs/tests/findings requiring intervention: INR is therapeutic for warfarin     Family present during ED course? No   Family " Comments/Social Situation comments: Pt from [facility]. Here after birth of premature baby in June. Has little support here in town. Baby has new lung infection which is causing stress.     Tasks needing completion: None    Estephanie Bird RN  [facility] -- [phone] [address] ED  [phone] [address] ED

## 2019-12-26 NOTE — PLAN OF CARE
Observation goals PRIOR TO DISCHARGE       - Serial troponins and stress test complete: No  - Seen and cleared by consultant if applicable: No  - Adequate pain control on oral analgesia: Yes  - Vital signs normal or at patient baseline: Yes  - Safe disposition plan has been identified: No

## 2019-12-26 NOTE — PHARMACY-ANTICOAGULATION SERVICE
Clinical Pharmacy - Warfarin Dosing Consult     Pharmacy has been consulted to manage this patient s warfarin therapy.  Indication: DVT/ PE Treatment  Therapy Goal: INR 2-3  OP Anticoag Clinic: [facility]  Warfarin Prior to Admission: Yes  Warfarin PTA Regimen: No current maintenance plan. Takes 7.5-10mg daily as directed by clinic  Significant drug interactions: PTA sertraline  Recent documented change in oral intake/nutrition: No    INR   Date Value Ref Range Status   12/26/2019 2.29 (H) 0.86 - 1.14 Final   12/23/2019 2.01 (H) 0.86 - 1.14 Final       Recommend warfarin 7.5 mg today.  Pharmacy will monitor Mesha Fields daily and order warfarin doses to achieve specified goal.      Please contact pharmacy as soon as possible if the warfarin needs to be held for a procedure or if the warfarin goals change.      Yaw Barnett, PharmD  Pharmacy Practice Resident

## 2019-12-26 NOTE — ED NOTES
Bed: ED18  Expected date:   Expected time:   Means of arrival:   Comments:  H422  29 yr F  Short of breath

## 2019-12-26 NOTE — PROGRESS NOTES
Pt here for dobutamine stress test.  Test, meds and side effects reviewed with patient.  Achieved target HR at 30 mcg Dobutamine and a total of 0.2 mg IV atropine.  Gave a total of 2 mg IV Metoprolol to bring HR back to baseline.  Post monitoring complete and VSS. Pt transferred back to  via transport.

## 2019-12-26 NOTE — H&P
"[facility]   History & Physical    Mesha Fields MRN# [id number]   Age: 29 year old YOB: 1990     Date of Admission: 2019    Primary Care Provider: Elida Aranda         Chief Complaint:   \"chest pain\"         History of Present Illness:   Mesha is a 29 year old female  who has a history of  on  at 23 weeks and 3 days for PPROM, anxiety, PCOS, and recent bilateral PEs (19, on coumadin) who presented to the ED with chest pain.  She states the pain awoke her from sleep in the middle of the night shortly before arriving to the ED.  Location if upper sternal area, non radiating, worse with breathing.  Feels similar to pain she had with the PEs.  She feels slightly SOB.  Denies breast pain, fever, chills, syncope, nausea, vomiting. She has been compliant with her coumadin.  Denies history of heartburn.  She notes increased stress with recent  birth and her daughter in the NICU.  She is currently living at the [facility].  She has been seeing a therapist and this has helped a lot.   In the ED the patient's vital signs were stable, she was afebrile. BMP, CBC unremarkable.  HCG negative. Troponin negative. INR 2.29.  D-dimer <0.3.  CT chest showed resolution of previous PEs, no other acute abnormalities. EKG showed NSR with no acute ischemic changes.   She was given 324 mg aspirin, GI cocktail with slight improvement.  ED MD would like patient admitted to the observation unit for ACS rule out.   Patient seen in the ED, pain improved to 2/10 after aspirin and GI cocktail.             Review of Systems:     All others reviewed and are negative         Past Medical History:     Past Medical History:   Diagnosis Date     Anxiety      PCOS (polycystic ovarian syndrome)              Past Surgical History:      Past Surgical History:   Procedure Laterality Date      SECTION  2019    PPROM, 32w3d             " "Family History:     Family History   Family history unknown: Yes             Social History:     Social History     Tobacco Use     Smoking status: Never Smoker     Smokeless tobacco: Never Used   Substance Use Topics     Alcohol use: Not Currently             Medications:   No current facility-administered medications on file prior to encounter.   acetaminophen (TYLENOL) 325 MG tablet, Take 325-650 mg by mouth every 6 hours as needed for mild pain  enoxaparin ANTICOAGULANT (LOVENOX) 100 MG/ML syringe, Inject the contents of 1 syringe subcutaneously every 12 hours as directed.  enoxaparin ANTICOAGULANT (LOVENOX) 100 MG/ML syringe, Inject 0.99 mLs (99 mg) Subcutaneous every 12 hours  ibuprofen (ADVIL/MOTRIN) 200 MG tablet, Take 200 mg by mouth every 4 hours as needed for mild pain  sertraline (ZOLOFT) 100 MG tablet, Take 1 tablet (100 mg) by mouth daily  warfarin ANTICOAGULANT (COUMADIN) 5 MG tablet, Take 1.5-2 tablets daily or as directed by coumadin clinic.  warfarin ANTICOAGULANT (COUMADIN) 7.5 MG tablet, Take 1 tablet (7.5 mg) by mouth daily             Allergies:   No Known Allergies          Physical Exam:   /68   Pulse 78   Temp 98.5  F (36.9  C) (Oral)   Resp 16   Ht 1.626 m (5' 4\")   Wt 98.4 kg (217 lb)   LMP  (LMP Unknown)   SpO2 96%   BMI 37.25 kg/m     GENERAL: Alert and oriented x 3. NAD.   HEENT: Anicteric sclera. PERRL. Mucous membranes moist and without lesions.   CV: RRR. S1, S2. No murmurs appreciated. Slight tenderness on palpation of chest wall  RESPIRATORY: Effort normal. Lungs CTAB with no wheezing, rales, rhonchi.   GI: Abdomen soft and non distended with normoactive bowel sounds present in all quadrants. No tenderness, rebound, guarding.   MUSCULOSKELETAL: No joint swelling or tenderness. Moves all extremities. Small areas of ecchymosis on abdomen from lovenox injections.  Well healing  incision.   NEUROLOGICAL: No focal deficits. Strength 5/5 bilaterally in upper and " lower extremities.   EXTREMITIES: No peripheral edema. Intact bilateral pedal pulses.   SKIN: No jaundice. No rashes.          Labs:   CBC:  Recent Labs   Lab Test 19   WBC 6.6   RBC 4.58   HGB 14.0   HCT 42.3   MCV 92   MCH 30.6   MCHC 33.1   RDW 12.6          CMP:  Recent Labs   Lab Test 19  0032     --  141   POTASSIUM 4.1  --  3.5   CHLORIDE 108  --  108   AMANDA 8.7  --  8.7   CO2 26  --  27   BUN 23  --  18   CR 0.78   < > 0.60   GLC 92  --  140*   AST  --   --  20   ALT  --   --  37   BILITOTAL  --   --  0.2   ALBUMIN  --   --  3.4   PROTTOTAL  --   --  7.2   ALKPHOS  --   --  105    < > = values in this interval not displayed.       INR:   Recent Labs   Lab Test 19   INR 2.29*            Imaging:   EXAM: CT CHEST PULMONARY EMBOLISM W CONTRAST  LOCATION: [facility]  DATE/TIME: 2019 6:39 AM     INDICATION: Acute pleuritic chest pain, history of PE  COMPARISON: 2019  TECHNIQUE: CT angiogram chest during arterial phase injection IV contrast. 2D and 3D MIP reconstructions were performed by the CT technologist. Dose reduction techniques were used.   CONTRAST: 90 mL of Isovue-370     FINDINGS:  ANGIOGRAM CHEST: Diffusely seen pulmonary emboli have resolved. Thoracic aorta is negative for dissection. No CT evidence of right heart strain.     LUNGS AND PLEURA: Scattered small pulmonary nodules, the largest measuring up to 4 mm, unchanged, likely reflecting granulomata.     MEDIASTINUM/AXILLAE: Normal. Residual thymus.     UPPER ABDOMEN: Normal.     MUSCULOSKELETAL: Normal.                                                                      IMPRESSION:  1.  Resolution of previously seen largely subsegmental pulmonary emboli. No acute pulmonary embolus.  2.  No CT findings to explain patient's reported pleuritic chest pain         Assessment and Plan:   Mesha Fields is a 29 year old female  who has a history of  on   at 23 weeks and 3 days for PPROM, anxiety, PCOS, and recent bilateral PEs (19, on coumadin) who presented to the ED with chest pain.      1. Chest pain: In the ED the patient's vital signs were stable, she was afebrile. BMP, CBC unremarkable.  HCG negative. Troponin negative. INR 2.29.  D-dimer <0.3.  CT chest showed resolution of previous PEs, no other acute abnormalities. EKG showed NSR with no acute ischemic changes.   She was given 324 mg aspirin, GI cocktail with slight improvement.  ED MD would like patient admitted to the observation unit for ACS rule out.   Patient seen in the ED, pain improved to 2/10 after aspirin and GI cocktail.    -admit to the observation unit  -serial troponins X 2 more  -dobutamine stress echo  -continue aspirin, prn GI cocktail  -try PPI    #anxiety: continue PTA Zoloft  #PE: Admitted to hospital  for bilateral PEs without evidence of right heart strain on chest CT.  Chest CT now shows resolving PEs, no other acute abnormalities. INR therapeutic.  -daily INR  -pharmacy to dose coumadin  #recent , lactating: She is using breast pump.    -obtain breast pump and supplies while here        Discussed with Dr. Woodard.     FEN: clears with no caffeine  Prophylaxis: coumadin  Code Status: Full        BERTHA Barrera, CNP  Ascom #[id number]               This patient was discussed with the Care Team in the OBS Unit.  The patient's chart was reviewed and the patient was also seen and evaluated by me.  The plan of care was discussed and reviewed with the Care Team.  The above documentation reflects the initial evaluation, medical decision making and plan under my supervision.    Truman Woodard MD, FACEP  [facility] Staff Emergency Physician

## 2019-12-26 NOTE — PROGRESS NOTES
VSS. Denies chest pain. Tolerating diet. Ambulated to bathroom. Voiding. DC instructions given to pt and  verbalized understanding. Educated provided and pt verbalized understanding. All belongings with pt, IV DC'd and documented. Pt discharged to [facility].

## 2019-12-26 NOTE — DISCHARGE SUMMARY
Discharge Summary    Mesha Fields MRN# [id number]   YOB: 1990 Age: 29 year old     Date of Admission:  12/26/2019  Date of Discharge:  12/26/2019  Admitting Physician:  Truman Woodard MD  Discharge Physician:  Dr. Woodard  Discharging Service:  Emergency Medicine     Primary Provider: Elida Aranda          Discharge Diagnosis:     Chest pain    * No resolved hospital problems. *               Discharge Disposition:   Discharged to home           Condition on Discharge:   Discharge condition: Stable   Code status on discharge: Full Code           Procedures:   No procedures performed during this admission          Discharge Medications:     Current Discharge Medication List      CONTINUE these medications which have NOT CHANGED    Details   acetaminophen (TYLENOL) 325 MG tablet Take 325-650 mg by mouth every 6 hours as needed for mild pain      enoxaparin ANTICOAGULANT (LOVENOX) 100 MG/ML syringe Inject the contents of 1 syringe subcutaneously every 12 hours as directed.  Qty: 10 Syringe, Refills: 1    Comments: Laura Ayers RN per warfarin protocol with Emili Perry NP.  Associated Diagnoses: Anticoagulated; Acute pulmonary embolism, unspecified pulmonary embolism type, unspecified whether acute cor pulmonale present (H)      ibuprofen (ADVIL/MOTRIN) 200 MG tablet Take 200 mg by mouth every 4 hours as needed for mild pain      sertraline (ZOLOFT) 100 MG tablet Take 1 tablet (100 mg) by mouth daily  Qty: 30 tablet, Refills: 0    Associated Diagnoses: Moderate major depression (H)      !! warfarin ANTICOAGULANT (COUMADIN) 5 MG tablet Take 1.5-2 tablets daily or as directed by coumadin clinic.  Qty: 60 tablet, Refills: 0    Comments: Laura Ayers RN per warfarin protocol with Emili Perry NP.  Associated Diagnoses: Anticoagulated; Acute pulmonary embolism, unspecified pulmonary embolism type, unspecified whether acute cor pulmonale present (H)      !! warfarin ANTICOAGULANT (COUMADIN)  7.5 MG tablet Take 1 tablet (7.5 mg) by mouth daily  Qty: 10 tablet, Refills: 0    Associated Diagnoses: Acute pulmonary embolism, unspecified pulmonary embolism type, unspecified whether acute cor pulmonale present (H); Anticoagulated       !! - Potential duplicate medications found. Please discuss with provider.                Consultations:   No consultations were requested during this admission             Brief History of Illness:   Please see detailed H&P from 19, in brief: Mesha is a 29 year old female  who has a history of  on  at 23 weeks and 3 days for PPROM, anxiety, PCOS, and recent bilateral PEs (19, on coumadin) who presented to the ED with chest pain.          Hospital Course:   Mesha Fields is a 29 year old female  who has a history of  on  at 23 weeks and 3 days for PPROM, anxiety, PCOS, and recent bilateral PEs (19, on coumadin) who presented to the ED with chest pain.       1. Chest pain: In the ED the patient's vital signs were stable, she was afebrile. BMP, CBC unremarkable.  HCG negative. Troponin negative. INR 2.29.  D-dimer <0.3.  CT chest showed resolution of previous PEs, no other acute abnormalities. EKG showed NSR with no acute ischemic changes.   She was given 324 mg aspirin, GI cocktail with slight improvement.  ED MD would like patient admitted to the observation unit for ACS rule out.   Patient seen in the ED, pain improved to 2/10 after aspirin and GI cocktail.  Serial troponins negative X 2, patient underwent dobutamine stress test and this was normal. Pain could be musculoskeletal versus GERD.  She will try prilosec 20 mg daily to see if this helps.  That should be safe in breastfeeding. Patient was given all results and instructed to follow up with her PCP in one week.  Return if the patient has new or worsening chest pain, SOB, nausea, or lightheadedness. Patient was in agreement with the plan and was discharged to home  "in good condition     #anxiety: continue PTA Zoloft  #PE: Admitted to hospital  for bilateral PEs without evidence of right heart strain on chest CT.  Chest CT now shows resolving PEs, no other acute abnormalities. INR therapeutic.  -continue PTA coumadin  #recent , lactating: She is using breast pump.                Final Day of Progress before Discharge:       Physical Exam:  Blood pressure 101/62, pulse 86, temperature 98.2  F (36.8  C), temperature source Oral, resp. rate 16, height 1.626 m (5' 4\"), weight 98.4 kg (217 lb), SpO2 98 %.    EXAM:  Exam:  Constitutional: healthy, alert and no distress  Cardiovascular: No lifts, heaves, or thrills. RRR. No murmurs, clicks gallops or rub  Respiratory: Good diaphragmatic excursion. Lungs clear  Gastrointestinal: Abdomen soft, non-tender. BS normal. No masses, organomegaly  Musculoskeletal: extremities normal- no gross deformities noted, gait normal and normal muscle tone  Skin: no suspicious lesions or rashes,  incision healing well  Neurologic: Gait normal. Alert and oriented.   Psychiatric: mentation appears normal and affect normal/bright    /62   Pulse 86   Temp 98.2  F (36.8  C) (Oral)   Resp 16   Ht 1.626 m (5' 4\")   Wt 98.4 kg (217 lb)   LMP  (LMP Unknown)   SpO2 98%   BMI 37.25 kg/m               Data:  All laboratory data reviewed             Significant Results:     Results for orders placed or performed during the hospital encounter of 19   Troponin I     Status: None   Result Value Ref Range    Troponin I ES <0.015 0.000 - 0.045 ug/L   CBC with platelets differential     Status: None   Result Value Ref Range    WBC 6.6 4.0 - 11.0 10e9/L    RBC Count 4.58 3.8 - 5.2 10e12/L    Hemoglobin 14.0 11.7 - 15.7 g/dL    Hematocrit 42.3 35.0 - 47.0 %    MCV 92 78 - 100 fl    MCH 30.6 26.5 - 33.0 pg    MCHC 33.1 31.5 - 36.5 g/dL    RDW 12.6 10.0 - 15.0 %    Platelet Count 283 150 - 450 10e9/L    Diff Method Automated Method  "    % Neutrophils 57.1 %    % Lymphocytes 31.1 %    % Monocytes 7.4 %    % Eosinophils 3.5 %    % Basophils 0.6 %    % Immature Granulocytes 0.3 %    Nucleated RBCs 0 0 /100    Absolute Neutrophil 3.8 1.6 - 8.3 10e9/L    Absolute Lymphocytes 2.1 0.8 - 5.3 10e9/L    Absolute Monocytes 0.5 0.0 - 1.3 10e9/L    Absolute Eosinophils 0.2 0.0 - 0.7 10e9/L    Absolute Basophils 0.0 0.0 - 0.2 10e9/L    Abs Immature Granulocytes 0.0 0 - 0.4 10e9/L    Absolute Nucleated RBC 0.0    Basic metabolic panel     Status: None   Result Value Ref Range    Sodium 140 133 - 144 mmol/L    Potassium 4.1 3.4 - 5.3 mmol/L    Chloride 108 94 - 109 mmol/L    Carbon Dioxide 26 20 - 32 mmol/L    Anion Gap 6 3 - 14 mmol/L    Glucose 92 70 - 99 mg/dL    Urea Nitrogen 23 7 - 30 mg/dL    Creatinine 0.78 0.52 - 1.04 mg/dL    GFR Estimate >90 >60 mL/min/[1.73_m2]    GFR Estimate If Black >90 >60 mL/min/[1.73_m2]    Calcium 8.7 8.5 - 10.1 mg/dL   D dimer quantitative     Status: None   Result Value Ref Range    D Dimer <0.3 0.0 - 0.50 ug/ml FEU   HCG qualitative     Status: None   Result Value Ref Range    HCG Qualitative Serum Negative NEG^Negative   INR     Status: Abnormal   Result Value Ref Range    INR 2.29 (H) 0.86 - 1.14   Troponin I     Status: None   Result Value Ref Range    Troponin I ES <0.015 0.000 - 0.045 ug/L   EKG 12-lead, tracing only     Status: None   Result Value Ref Range    Interpretation ECG Click View Image link to view waveform and result       Recent Results (from the past 48 hour(s))   CT Chest Pulmonary Embolism w Contrast    Narrative    EXAM: CT CHEST PULMONARY EMBOLISM W CONTRAST  LOCATION: [facility]  DATE/TIME: 12/26/2019 6:39 AM    INDICATION: Acute pleuritic chest pain, history of PE  COMPARISON: 12/11/2019  TECHNIQUE: CT angiogram chest during arterial phase injection IV contrast. 2D and 3D MIP reconstructions were performed by the CT technologist. Dose reduction techniques were used.   CONTRAST: 90 mL  of Isovue-370    FINDINGS:  ANGIOGRAM CHEST: Diffusely seen pulmonary emboli have resolved. Thoracic aorta is negative for dissection. No CT evidence of right heart strain.    LUNGS AND PLEURA: Scattered small pulmonary nodules, the largest measuring up to 4 mm, unchanged, likely reflecting granulomata.    MEDIASTINUM/AXILLAE: Normal. Residual thymus.    UPPER ABDOMEN: Normal.    MUSCULOSKELETAL: Normal.      Impression    IMPRESSION:  1.  Resolution of previously seen largely subsegmental pulmonary emboli. No acute pulmonary embolus.  2.  No CT findings to explain patient's reported pleuritic chest pain   Dobutamine Stress Echocardiogram    Narrative    [id number]  [facility]  [id number]  [id number]^CAMILLE^AMINA^MARY ANNE           [facility]  Echocardiography Laboratory  [address] [place] [address]     Name: GONZALO GAN  MRN: [id number]  : 1990  Study Date: 2019 11:12 AM  Age: 29 yrs  Gender: Female  Patient Location: [facility]  Reason For Study: Chest Discomfort  Ordering Physician: AMINA OBRIEN  Referring Physician: Elida Velazquez  Performed By: Holly Day RDCS     BSA: 2.0 m2  Height: 64 in  Weight: 217 lb  HR: 83  BP: 104/66 mmHg  _____________________________________________________________________________  __     _____________________________________________________________________________  __        Interpretation Summary  Normal, low-risk dobutamine stress echocardiogram.  The target heart rate was achieved. Normal heart rate response to dobutamine.  BP didnot change.  Normal biventricular size, thickness, and global systolic function at  baseline, LVEF=55-60%.  With dobutamine, LVEF increased to >70% and LV cavity size decreased  appropriately.  No regional wall motion abnormalities are present at rest or with dobutamine.  No angina was elicited.  No ECG evidence of ischemia.  No significant valvular abnormalities are noted on screening Doppler exam.  The  aortic root and visualized ascending aorta are normal.  _____________________________________________________________________________  __     Stress  The patient did not exhibit any symptoms during drug infusion.  The drug infusion was stopped due to target heart rate achieved.  The maximum dose of dobutamine was 30mcg/kg/min.  The maximum dose of atropine was 0.2mg.  The maximum dose of metoprolol was 2.0mg.  Definity (NDC #49410-869-62) given intravenously.  Patient was given 6ml mixture of 1.5ml Definity and 8.5ml saline.  4 ml wasted.  Definity Expiration 06/01/20 .  Definity Lot # [id number] .     Stress Results                                       Maximum Predicted HR:   190 bpm             Target HR: 162 bpm        % Maximum Predicted HR: 86 %                           Stage DurationHeart Rate  BP   Dose                               (mm:ss)   (bpm)                      Baseline  0:00      83    104/66 0.00                        Peak    8:02      164   109/7630.00                            Stress Duration:   8:02 mm:ss *                      Maximum Stress HR: 164 bpm *     Procedure  Dobutamine stress echo with two dimensional color and spectral Doppler  performed. Contrast Definity.  _____________________________________________________________________________  __     MMode/2D Measurements & Calculations  asc Aorta Diam: 2.3 cm  LVOT diam: 2.3 cm  LVOT area: 4.1 cm2                 _____________________________________________________________________________  __           Report approved by: Nicola VEE 12/26/2019 12:00 PM                   Pending Results:   Unresulted Labs Ordered in the Past 30 Days of this Admission     No orders found from 11/26/2019 to 12/27/2019.                  Discharge Instructions and Follow-Up:     Discharge Procedure Orders   Reason for your hospital stay   Order Comments: You were admitted to the observation unit for evaluation of your chest pain.  You had no EKG changes,  labs checking for heart damage were negative, your chest CT showed resolving blood clots, no other abnormalities.  You underwent a stress test and this was normal.  You could try OTC prilosec 20 mg daily for this pain to see if it is more heartburn related.     Adult [facility] Follow-up and recommended labs and tests   Order Comments: Follow up with primary care provider, Elida Aranda, within 7 days for hospital follow- up.  No follow up labs or test are needed.      Appointments on [place] and/or [facility] (with [facility] or [facility] provider or service). Call [phone] if you haven't heard regarding these appointments within 7 days of discharge.     Activity   Order Comments: Your activity upon discharge: activity as tolerated     Order Specific Question Answer Comments   Is discharge order? Yes      When to contact your care team   Order Comments: Return to the ER if you have new or worsening chest pain, shortness of breath, jaw or arm pain, or fainting.     Diet   Order Comments: Follow this diet upon discharge: Orders Placed This Encounter      Regular Diet Adult     Order Specific Question Answer Comments   Is discharge order? Yes           Attestation:  Ruthy Donald, BERTHA CNP.  BERTHA, CNP          This patient was discussed with the Care Team in the OBS Unit.  The patient's chart was reviewed and the patient was also seen and evaluated by me.  The plan of care was discussed and reviewed with the Care Team.  The above documentation reflects the evaluation, medical decision making and plan under my supervision.    Truman Woodard MD, FACEP  [facility] Staff Emergency Physician

## 2019-12-26 NOTE — PLAN OF CARE
"Observation goals PRIOR TO DISCHARGE       - Serial troponins and stress test complete: Yes  - Seen and cleared by consultant if applicable: No  - Adequate pain control on oral analgesia: Yes  - Vital signs normal or at patient baseline: Yes  - Safe disposition plan has been identified: Yes    /62   Pulse 86   Temp 98.2  F (36.8  C) (Oral)   Resp 16   Ht 1.626 m (5' 4\")   Wt 98.4 kg (217 lb)   LMP  (LMP Unknown)   SpO2 98%   BMI 37.25 kg/m         "

## 2019-12-26 NOTE — ED PROVIDER NOTES
"  History     Chief Complaint   Patient presents with     Shortness of Breath     HPI  Mesha Fields is a 29 year old female with PMH notable for DVT and PE, now on warfarin who presents to the ED with chest pain. Pain awoke her from sleep shortly before arrival. Location is upper sternal area, non-radiating. it is worse with breathing. She feels mildly short of breath. Patient reports INR 3 days ago was 2.01, is taking 7.5 mg warfarin each day.     I have reviewed the Medications, Allergies, Past Medical and Surgical History, and Social History in the Epic system.    Review of Systems  A complete review of systems was performed with pertinent positives and negatives noted in the HPI, and all other systems negative.     Physical Exam   BP: 111/79  Pulse: 109  Heart Rate: 86  Temp: 98.5  F (36.9  C)  Resp: 22  Height: 162.6 cm (5' 4\")  Weight: 98.4 kg (217 lb)  SpO2: 98 %    Physical Exam  General: mildly uncomfortable appearing. Appears stated age.   HENT: MMM, no oropharyngeal lesions  Eyes: PERRL, normal sclerae  Cardio: regular rate. Regular rhythm. Extremities well perfused  Resp: Normal work of breathing, clear breath sounds  Chest/Back: no visual signs of trauma, reproduction of pain with palpation around the upper sternal area  Abdomen: no tenderness, non-distended, no rebound, no guarding  Neuro: alert and fully oriented. CN II-XII grossly intact. Grossly normal strength and sensation in all extremities.   MSK: no deformities.   Integumentary/Skin: no rash visualized, normal color  Psych: normal affect, normal behavior    ED Course      Procedures             EKG Interpretation:      Interpreted by Kevin Anglin MD  Time reviewed: 0535  Symptoms at time of EKG: chest pain   Rhythm: normal sinus   Rate: Normal  Axis: Normal  Ectopy: none  Conduction: normal  ST Segments/ T Waves: No ST-T wave changes and No acute ischemic changes  Q Waves: none  Comparison to prior: Unchanged from 12/11/2019  Clinical " Impression: normal EKG, similar to previous    Critical Care time:  none     Labs Ordered and Resulted from Time of ED Arrival Up to the Time of Departure from the ED   INR - Abnormal; Notable for the following components:       Result Value    INR 2.29 (*)     All other components within normal limits   TROPONIN I   CBC WITH PLATELETS DIFFERENTIAL   BASIC METABOLIC PANEL   D DIMER QUANTITATIVE   HCG QUALITATIVE   CARDIAC CONTINUOUS MONITORING     CT Chest Pulmonary Embolism w Contrast   Final Result   IMPRESSION:   1.  Resolution of previously seen largely subsegmental pulmonary emboli. No acute pulmonary embolus.   2.  No CT findings to explain patient's reported pleuritic chest pain             Assessments & Plan (with Medical Decision Making)   Patient presenting with acute onset of pleuritic chest pain. Vitals in the ED notable for initial mild tachycardia ~110, SpO2 97-99% on RA. Initial differential diagnosis includes but not limited to PE, ACS, reflux, PNA.     INR therapeutic. CT PA showed PE resolution, no evidence of pneumothorax, pneumonia, nor other visualized pathology in the chest to explain the patient's pain.     ECG without evidence of acute ischemia, initial troponin negative but very short time duration from pain onset to lab draw - insufficient to fully rule out ACS. Pain decreased while in the ED, but still 50% of its earlier intensity. Patient with overall fairly low ACS risk factor profile, but would benefit from observation admission for repeat ECG/troponin given ongoing pain.       After counseling on the diagnosis, work-up, and treatment plan, the patient was admitted to ED obs.       Final diagnoses:   Pleuritic chest pain   Acute chest pain       --  Kevin Anglin MD   Emergency Medicine     I have reviewed the nursing notes.  I have reviewed the findings, diagnosis, plan and need for follow up with the patient.  12/26/2019   [facility], EMERGENCY DEPARTMENT     Kevin Anglin  MD [place]  12/26/19 0709

## 2020-01-03 ENCOUNTER — OFFICE VISIT (OUTPATIENT)
Dept: FAMILY MEDICINE | Facility: CLINIC | Age: 30
End: 2020-01-03
Payer: COMMERCIAL

## 2020-01-03 ENCOUNTER — ANTICOAGULATION THERAPY VISIT (OUTPATIENT)
Dept: ANTICOAGULATION | Facility: CLINIC | Age: 30
End: 2020-01-03

## 2020-01-03 VITALS
WEIGHT: 222.4 LBS | BODY MASS INDEX: 37.97 KG/M2 | OXYGEN SATURATION: 95 % | SYSTOLIC BLOOD PRESSURE: 117 MMHG | HEART RATE: 99 BPM | HEIGHT: 64 IN | DIASTOLIC BLOOD PRESSURE: 81 MMHG

## 2020-01-03 DIAGNOSIS — F41.1 GENERALIZED ANXIETY DISORDER: ICD-10-CM

## 2020-01-03 DIAGNOSIS — F32.1 MODERATE MAJOR DEPRESSION (H): Primary | ICD-10-CM

## 2020-01-03 DIAGNOSIS — I26.99 ACUTE PULMONARY EMBOLISM, UNSPECIFIED PULMONARY EMBOLISM TYPE, UNSPECIFIED WHETHER ACUTE COR PULMONALE PRESENT (H): ICD-10-CM

## 2020-01-03 DIAGNOSIS — G47.09 OTHER INSOMNIA: ICD-10-CM

## 2020-01-03 DIAGNOSIS — Z79.01 ANTICOAGULATED: ICD-10-CM

## 2020-01-03 DIAGNOSIS — Z79.01 ANTICOAGULATED ON COUMADIN: ICD-10-CM

## 2020-01-03 LAB — INR PPP: 1.73 (ref 0.86–1.14)

## 2020-01-03 RX ORDER — TRAZODONE HYDROCHLORIDE 50 MG/1
50 TABLET, FILM COATED ORAL AT BEDTIME
Qty: 30 TABLET | Refills: 0 | Status: SHIPPED | OUTPATIENT
Start: 2020-01-03

## 2020-01-03 ASSESSMENT — MIFFLIN-ST. JEOR: SCORE: 1713.8

## 2020-01-03 ASSESSMENT — ANXIETY QUESTIONNAIRES
6. BECOMING EASILY ANNOYED OR IRRITABLE: MORE THAN HALF THE DAYS
3. WORRYING TOO MUCH ABOUT DIFFERENT THINGS: NEARLY EVERY DAY
1. FEELING NERVOUS, ANXIOUS, OR ON EDGE: MORE THAN HALF THE DAYS
2. NOT BEING ABLE TO STOP OR CONTROL WORRYING: NEARLY EVERY DAY
GAD7 TOTAL SCORE: 16
5. BEING SO RESTLESS THAT IT IS HARD TO SIT STILL: SEVERAL DAYS
7. FEELING AFRAID AS IF SOMETHING AWFUL MIGHT HAPPEN: MORE THAN HALF THE DAYS

## 2020-01-03 ASSESSMENT — PAIN SCALES - GENERAL: PAINLEVEL: MILD PAIN (2)

## 2020-01-03 ASSESSMENT — ENCOUNTER SYMPTOMS
CHILLS: 0
FEVER: 0
NERVOUS/ANXIOUS: 1
FATIGUE: 0
DEPRESSION: 1

## 2020-01-03 ASSESSMENT — PATIENT HEALTH QUESTIONNAIRE - PHQ9
5. POOR APPETITE OR OVEREATING: NEARLY EVERY DAY
SUM OF ALL RESPONSES TO PHQ QUESTIONS 1-9: 19

## 2020-01-03 NOTE — PROGRESS NOTES
"       HPI       Mesha Fields is a 29 year old woman who presents for follow up on her INR, depression and anxiety. Mesha had a c sections more than 1 month ago, soon after that she ended up having a PE and was started on anticoagulation for this. She was bridged with Lovenox and now has been managed on Coumadin. Mesha is not feeling well. She is not sleeping well. She is away from her [place] due to the fact that her micr preemie is at [facility] [place]. Mesha feels lonely and depressed. She is taking Zoloft,  however this does not seem to be enough to help her.   Chief Complaint   Patient presents with     INR Followup     Pt is here to follow up on INR.     Depression     Pt is here to discuss depression and anxiety.     Problem, Medication and Allergy Lists were reviewed and updated if needed.    Patient is an established patient of this clinic.         Review of Systems:   Review of Systems     Constitutional:  Negative for fever, chills and fatigue.   Psychiatric/Behavioral:  Positive for depression.                Physical Exam:     Vitals:    20 0844   BP: 117/81   Pulse: 99   SpO2: 95%   Weight: 100.9 kg (222 lb 6.4 oz)   Height: 1.626 m (5' 4\")     Body mass index is 38.17 kg/m .  Vitals were reviewed and were normal.     Physical Exam  Constitutional:       Appearance: Normal appearance.   HENT:      Head: Normocephalic.   Musculoskeletal: Normal range of motion.   Skin:     General: Skin is warm and dry.      Comments:  scar is CDI.    Neurological:      General: No focal deficit present.      Mental Status: She is alert and oriented to person, place, and time.   Psychiatric:         Mood and Affect: Mood normal.         Behavior: Behavior normal.          Results:     JOCELYNN-7 SCORE 1/3/2020   Total Score 16     PHQ-9 SCORE 1/3/2020   PHQ-9 Total Score 19     Assessment and Plan     1. Moderate major depression (H)    - traZODone (DESYREL) 50 MG tablet; Take 1 tablet (50 " mg) by mouth At Bedtime  Dispense: 30 tablet; Refill: 0    2. Generalized anxiety disorder    - traZODone (DESYREL) 50 MG tablet; Take 1 tablet (50 mg) by mouth At Bedtime  Dispense: 30 tablet; Refill: 0    3. Other insomnia    - traZODone (DESYREL) 50 MG tablet; Take 1 tablet (50 mg) by mouth At Bedtime  Dispense: 30 tablet; Refill: 0    4. Anticoagulated on Coumadin    There are no discontinued medications.  Total time spent 25 minutes.  More than 50% of the time spent with patient on counseling / coordinating her care. First, Mesha will continue on Zoloft and start Trazodone. Next, she will have her INR completed today. Next, she will return in 2 weeks, calling with any concerns prior to that time. Options for treatment and follow-up care were reviewed with the patient. Mesha Fields  engaged in the decision making process and verbalized understanding of the options discussed and agreed with the final plan.  Emili Perry, APRN, CNP

## 2020-01-03 NOTE — PROGRESS NOTES
ANTICOAGULATION FOLLOW-UP CLINIC VISIT    Patient Name:  Mesha Fiedls  Date:  1/3/2020  Contact Type:  Telephone    SUBJECTIVE:  Patient Findings         Clinical Outcomes     Negatives:   Major bleeding event, Thromboembolic event, Anticoagulation-related hospital admission, Anticoagulation-related ED visit, Anticoagulation-related fatality           OBJECTIVE    INR   Date Value Ref Range Status   2020 1.73 (H) 0.86 - 1.14 Final       ASSESSMENT / PLAN  INR assessment SUB    Recheck INR In: 3 DAYS    INR Location Clinic      Anticoagulation Summary  As of 1/3/2020    INR goal:   2.0-3.0   TTR:   65.4 % (1.6 wk)   INR used for dosin.73! (1/3/2020)   Warfarin maintenance plan:   No maintenance plan   Full warfarin instructions:   1/3: 10 mg; : 10 mg; : 7.5 mg; Otherwise No maintenance plan   Next INR check:   2020   Priority:   High   Target end date:   3/13/2020    Indications    Anticoagulated [Z79.01]  Acute pulmonary embolism  unspecified pulmonary embolism type  unspecified whether acute cor pulmonale present (H) [I26.99]             Anticoagulation Episode Summary     INR check location:       Preferred lab:       Send INR reminders to:   [facility]    Comments:   Emili Perry NP contact #[phone] cell [phone] +++stop date for coumadin: 3/13/20+++      Anticoagulation Care Providers     Provider Role Specialty Phone number    Emili Perry, NP Referring [facility] [phone]            See the Encounter Report to view Anticoagulation Flowsheet and Dosing Calendar (Go to Encounters tab in chart review, and find the Anticoagulation Therapy Visit)    Spoke with patient. Gave them their lab results and new warfarin recommendation.  No changes in health, medication, or diet.  No signs or symptoms of bleed or clotting.  Missed warfarin dose on 20.    Emili Perry NP was contacted to consult if Lovenox was needed.  She advised no Lovenox.      Ana  Ramana  Pharmacist Intern, PD2

## 2020-01-03 NOTE — PATIENT INSTRUCTIONS

## 2020-01-03 NOTE — NURSING NOTE
"29 year old  Chief Complaint   Patient presents with     INR Followup     Pt is here to follow up on INR.     Depression     Pt is here to discuss depression and anxiety.       Blood pressure 117/81, pulse 99, height 1.626 m (5' 4\"), weight 100.9 kg (222 lb 6.4 oz), SpO2 95 %. Body mass index is 38.17 kg/m .  BP completed using cuff size:      Monika Ferrera, TOYA  January 3, 2020 8:47 AM  "

## 2020-01-04 ASSESSMENT — ANXIETY QUESTIONNAIRES: GAD7 TOTAL SCORE: 16

## 2020-01-06 ENCOUNTER — ANTICOAGULATION THERAPY VISIT (OUTPATIENT)
Dept: ANTICOAGULATION | Facility: CLINIC | Age: 30
End: 2020-01-06

## 2020-01-06 DIAGNOSIS — Z79.01 ANTICOAGULATED: ICD-10-CM

## 2020-01-06 DIAGNOSIS — I26.99 ACUTE PULMONARY EMBOLISM, UNSPECIFIED PULMONARY EMBOLISM TYPE, UNSPECIFIED WHETHER ACUTE COR PULMONALE PRESENT (H): ICD-10-CM

## 2020-01-06 LAB — INR PPP: 1.39 (ref 0.86–1.14)

## 2020-01-06 NOTE — PROGRESS NOTES
ANTICOAGULATION FOLLOW-UP CLINIC VISIT    Patient Name:  Mesha Fields  Date:  2020  Contact Type:  Telephone    SUBJECTIVE:  Patient Findings     Comments:   INR today is 1.39.  Mesha denies any missed doses of warfarin.  No changes in health, diet, medications. She is not drinking nutritional supplements, no vitamins.  Unexplained sub therapeutic INR.    Discussed with Emili Perry NP--Mesha will start lovenox 100 mg BID until INR is therapeutic.  Mesha has 4 syringes of lovenox with her.  She has enough to get through 2020 AM, so will check INR 8.          Clinical Outcomes     Comments:   INR today is 1.39.  Mesha denies any missed doses of warfarin.  No changes in health, diet, medications. She is not drinking nutritional supplements, no vitamins.  Unexplained sub therapeutic INR.    Discussed with Emili Perry NP--Mesha will start lovenox 100 mg BID until INR is therapeutic.  Mesha has 4 syringes of lovenox with her.  She has enough to get through 2020 AM, so will check INR 8.             OBJECTIVE    INR   Date Value Ref Range Status   2020 1.39 (H) 0.86 - 1.14 Final       ASSESSMENT / PLAN  INR assessment SUB    Recheck INR In: 2 DAYS    INR Location Clinic      Anticoagulation Summary  As of 2020    INR goal:   2.0-3.0   TTR:   50.9 % (2 wk)   INR used for dosin.39! (2020)   Warfarin maintenance plan:   No maintenance plan   Full warfarin instructions:   : 12.5 mg; : 10 mg; Otherwise No maintenance plan   Next INR check:   2020   Priority:   High   Target end date:   3/13/2020    Indications    Anticoagulated [Z79.01]  Acute pulmonary embolism  unspecified pulmonary embolism type  unspecified whether acute cor pulmonale present (H) [I26.99]             Anticoagulation Episode Summary     INR check location:       Preferred lab:       Send INR reminders to:   [facility]    Comments:   Emili Perry NP contact #[phone] cell [phone] +++stop date  for coumadin: 3/13/20+++      Anticoagulation Care Providers     Provider Role Specialty Phone number    Emili Perry, NP Referring Family Practice [phone]            See the Encounter Report to view Anticoagulation Flowsheet and Dosing Calendar (Go to Encounters tab in chart review, and find the Anticoagulation Therapy Visit)    Spoke with Mesha.  See patient findings.  Spoke with Emili WILEY.  Mesha will take lovenox 100 mg BID until INR is therapeutic.      Verna Rojo RN

## 2020-01-08 ENCOUNTER — ANTICOAGULATION THERAPY VISIT (OUTPATIENT)
Dept: ANTICOAGULATION | Facility: CLINIC | Age: 30
End: 2020-01-08

## 2020-01-08 DIAGNOSIS — I26.99 ACUTE PULMONARY EMBOLISM, UNSPECIFIED PULMONARY EMBOLISM TYPE, UNSPECIFIED WHETHER ACUTE COR PULMONALE PRESENT (H): ICD-10-CM

## 2020-01-08 DIAGNOSIS — Z79.01 ANTICOAGULATED: ICD-10-CM

## 2020-01-08 LAB — INR PPP: 1.72 (ref 0.86–1.14)

## 2020-01-08 NOTE — PROGRESS NOTES
Addendum 20 Patient called to report she started having vaginal bleeding.  She reports she is going through 3-4 pads per day.  Patient is also passing some clots about the size of a silver dollar.  Writer instructed patient to continue to monitor bleeding and clots and if anything worsens then patient should be seen.    Writer updated Emili Perry and she was in agreement with plan.  Patient should schedule a follow up appointment with Emili. [facility]                ANTICOAGULATION FOLLOW-UP CLINIC VISIT    Patient Name:  Mesha Fields  Date:  2020  Contact Type:  Telephone    SUBJECTIVE:  Patient Findings     Comments:   Instructions to continue Lovenox 100mg Q 12 Hours. Request pt to call us back if we need to send a refill        Clinical Outcomes     Comments:   Instructions to continue Lovenox 100mg Q 12 Hours. Request pt to call us back if we need to send a refill           OBJECTIVE    INR   Date Value Ref Range Status   2020 1.72 (H) 0.86 - 1.14 Final       ASSESSMENT / PLAN  INR assessment SUB    Recheck INR In: 2 DAYS    INR Location Clinic      Anticoagulation Summary  As of 2020    INR goal:   2.0-3.0   TTR:   45.3 % (2.3 wk)   INR used for dosin.72! (2020)   Warfarin maintenance plan:   No maintenance plan   Full warfarin instructions:   : 12.5 mg; : 12.5 mg; Otherwise No maintenance plan   Next INR check:   1/10/2020   Priority:   High   Target end date:   3/13/2020    Indications    Anticoagulated [Z79.01]  Acute pulmonary embolism  unspecified pulmonary embolism type  unspecified whether acute cor pulmonale present (H) [I26.99]             Anticoagulation Episode Summary     INR check location:       Preferred lab:       Send INR reminders to:   [facility]    Comments:   Emili Perry NP contact #[phone] cell [phone] +++stop date for coumadin: 3/13/20+++      Anticoagulation Care Providers     Provider Role Specialty Phone number    Emili Perry, INEZ  Referring [facility] [phone]            See the Encounter Report to view Anticoagulation Flowsheet and Dosing Calendar (Go to Encounters tab in chart review, and find the Anticoagulation Therapy Visit)    Left message for patient with results and dosing recommendations. Asked patient to call back to report any missed doses, falls, signs and symptoms of bleeding or clotting, any changes in health, medication, or diet. Asked patient to call back with any questions or concerns.     Nesha Mclean RN

## 2020-01-09 ENCOUNTER — ANTICOAGULATION THERAPY VISIT (OUTPATIENT)
Dept: ANTICOAGULATION | Facility: CLINIC | Age: 30
End: 2020-01-09

## 2020-01-09 ENCOUNTER — OFFICE VISIT (OUTPATIENT)
Dept: FAMILY MEDICINE | Facility: CLINIC | Age: 30
End: 2020-01-09
Payer: COMMERCIAL

## 2020-01-09 ENCOUNTER — ANCILLARY PROCEDURE (OUTPATIENT)
Dept: ULTRASOUND IMAGING | Facility: CLINIC | Age: 30
End: 2020-01-09
Attending: OBSTETRICS & GYNECOLOGY
Payer: COMMERCIAL

## 2020-01-09 ENCOUNTER — OFFICE VISIT (OUTPATIENT)
Dept: OBGYN | Facility: CLINIC | Age: 30
End: 2020-01-09
Attending: OBSTETRICS & GYNECOLOGY
Payer: COMMERCIAL

## 2020-01-09 VITALS
HEIGHT: 64 IN | DIASTOLIC BLOOD PRESSURE: 78 MMHG | SYSTOLIC BLOOD PRESSURE: 113 MMHG | HEART RATE: 93 BPM | BODY MASS INDEX: 37.51 KG/M2 | WEIGHT: 219.7 LBS

## 2020-01-09 VITALS
RESPIRATION RATE: 16 BRPM | WEIGHT: 222 LBS | OXYGEN SATURATION: 96 % | HEIGHT: 64 IN | TEMPERATURE: 98 F | HEART RATE: 90 BPM | BODY MASS INDEX: 37.9 KG/M2 | SYSTOLIC BLOOD PRESSURE: 121 MMHG | DIASTOLIC BLOOD PRESSURE: 77 MMHG

## 2020-01-09 DIAGNOSIS — F41.1 GENERALIZED ANXIETY DISORDER: ICD-10-CM

## 2020-01-09 DIAGNOSIS — F32.1 MODERATE MAJOR DEPRESSION (H): ICD-10-CM

## 2020-01-09 DIAGNOSIS — M79.662 PAIN OF LEFT CALF: ICD-10-CM

## 2020-01-09 DIAGNOSIS — I26.99 ACUTE PULMONARY EMBOLISM, UNSPECIFIED PULMONARY EMBOLISM TYPE, UNSPECIFIED WHETHER ACUTE COR PULMONALE PRESENT (H): ICD-10-CM

## 2020-01-09 DIAGNOSIS — N93.9 ABNORMAL UTERINE BLEEDING (AUB): Primary | ICD-10-CM

## 2020-01-09 DIAGNOSIS — Z79.01 ANTICOAGULATED: ICD-10-CM

## 2020-01-09 DIAGNOSIS — Z79.01 ADEQUATE ANTICOAGULATION ON ANTICOAGULANT THERAPY: ICD-10-CM

## 2020-01-09 DIAGNOSIS — N93.9 ABNORMAL UTERINE BLEEDING (AUB): ICD-10-CM

## 2020-01-09 DIAGNOSIS — N93.9 VAGINAL BLEEDING: ICD-10-CM

## 2020-01-09 DIAGNOSIS — Z79.01 LONG TERM CURRENT USE OF ANTICOAGULANT THERAPY: ICD-10-CM

## 2020-01-09 DIAGNOSIS — N93.9 VAGINAL BLEEDING: Primary | ICD-10-CM

## 2020-01-09 LAB
D DIMER PPP FEU-MCNC: 0.3 UG/ML FEU (ref 0–0.5)
ERYTHROCYTE [DISTWIDTH] IN BLOOD BY AUTOMATED COUNT: 12.8 % (ref 10–15)
HCT VFR BLD AUTO: 40.4 % (ref 35–47)
HEMOGLOBIN: 12.4 G/DL (ref 11.7–15.7)
HGB BLD-MCNC: 13.3 G/DL (ref 11.7–15.7)
INR PPP: 2.07 (ref 0.86–1.14)
MCH RBC QN AUTO: 30 PG (ref 26.5–33)
MCHC RBC AUTO-ENTMCNC: 32.9 G/DL (ref 31.5–36.5)
MCV RBC AUTO: 91 FL (ref 78–100)
PLATELET # BLD AUTO: 263 10E9/L (ref 150–450)
RBC # BLD AUTO: 4.44 10E12/L (ref 3.8–5.2)
WBC # BLD AUTO: 7.3 10E9/L (ref 4–11)

## 2020-01-09 PROCEDURE — 25000128 H RX IP 250 OP 636: Mod: ZF | Performed by: OBSTETRICS & GYNECOLOGY

## 2020-01-09 PROCEDURE — 96372 THER/PROPH/DIAG INJ SC/IM: CPT | Mod: ZF

## 2020-01-09 PROCEDURE — 85018 HEMOGLOBIN: CPT | Mod: ZF | Performed by: OBSTETRICS & GYNECOLOGY

## 2020-01-09 PROCEDURE — 76830 TRANSVAGINAL US NON-OB: CPT

## 2020-01-09 PROCEDURE — G0463 HOSPITAL OUTPT CLINIC VISIT: HCPCS | Mod: ZF

## 2020-01-09 RX ORDER — WARFARIN SODIUM 5 MG/1
10 TABLET ORAL DAILY
Qty: 8 TABLET | Refills: 0 | Status: SHIPPED | OUTPATIENT
Start: 2020-01-09 | End: 2020-01-09

## 2020-01-09 RX ORDER — WARFARIN SODIUM 5 MG/1
10 TABLET ORAL DAILY
Qty: 8 TABLET | Refills: 0 | COMMUNITY
Start: 2020-01-09

## 2020-01-09 RX ORDER — MEDROXYPROGESTERONE ACETATE 150 MG/ML
150 INJECTION, SUSPENSION INTRAMUSCULAR
Status: ACTIVE | OUTPATIENT
Start: 2020-01-09

## 2020-01-09 RX ORDER — MEDROXYPROGESTERONE ACETATE 10 MG
10 TABLET ORAL 2 TIMES DAILY
Qty: 30 TABLET | Refills: 1 | Status: SHIPPED | OUTPATIENT
Start: 2020-01-09

## 2020-01-09 RX ADMIN — MEDROXYPROGESTERONE ACETATE 150 MG: 150 INJECTION, SUSPENSION INTRAMUSCULAR at 14:29

## 2020-01-09 ASSESSMENT — ANXIETY QUESTIONNAIRES
7. FEELING AFRAID AS IF SOMETHING AWFUL MIGHT HAPPEN: NEARLY EVERY DAY
2. NOT BEING ABLE TO STOP OR CONTROL WORRYING: NEARLY EVERY DAY
1. FEELING NERVOUS, ANXIOUS, OR ON EDGE: NEARLY EVERY DAY
6. BECOMING EASILY ANNOYED OR IRRITABLE: NEARLY EVERY DAY
7. FEELING AFRAID AS IF SOMETHING AWFUL MIGHT HAPPEN: NEARLY EVERY DAY
2. NOT BEING ABLE TO STOP OR CONTROL WORRYING: NEARLY EVERY DAY
GAD7 TOTAL SCORE: 20
1. FEELING NERVOUS, ANXIOUS, OR ON EDGE: NEARLY EVERY DAY
5. BEING SO RESTLESS THAT IT IS HARD TO SIT STILL: MORE THAN HALF THE DAYS
GAD7 TOTAL SCORE: 20
3. WORRYING TOO MUCH ABOUT DIFFERENT THINGS: NEARLY EVERY DAY
6. BECOMING EASILY ANNOYED OR IRRITABLE: NEARLY EVERY DAY
5. BEING SO RESTLESS THAT IT IS HARD TO SIT STILL: MORE THAN HALF THE DAYS
3. WORRYING TOO MUCH ABOUT DIFFERENT THINGS: NEARLY EVERY DAY

## 2020-01-09 ASSESSMENT — ENCOUNTER SYMPTOMS
CHILLS: 0
FATIGUE: 1
FEVER: 0

## 2020-01-09 ASSESSMENT — MIFFLIN-ST. JEOR
SCORE: 1711.99
SCORE: 1701.55

## 2020-01-09 ASSESSMENT — PATIENT HEALTH QUESTIONNAIRE - PHQ9
5. POOR APPETITE OR OVEREATING: NEARLY EVERY DAY
5. POOR APPETITE OR OVEREATING: NEARLY EVERY DAY
SUM OF ALL RESPONSES TO PHQ QUESTIONS 1-9: 14

## 2020-01-09 ASSESSMENT — PAIN SCALES - GENERAL: PAINLEVEL: MODERATE PAIN (4)

## 2020-01-09 NOTE — PROGRESS NOTES
"28 yo now 6 weeks s/p repeat, classical  section.  Awoke at 3 am this morning with heavy vaginal bleeding.  Bleeding had stopped after 2 weeks of delivery, but had a little bit of bleeding when she started lovenox and warfarin after a diagnosis of a PE. Has soaked through 6 or 7 pads since 3 am.  No urine leakage.  Starting to feel lightheaded, also feels nauseated.  Her hemoglobin earlier today was 13.3, INR 2.07    Patient has had anxiety since her delivery.  With the onset of bleeding today it is \"through the roof\".  She continues to stay at [facility] while her daughter is in the NICU at [facility].  Her  and son (10 years old) are back home in [place], [place].    The patient is not currently using any contraceptive method.  In the past she has tried a Mirena IUD which became imbedded in her uterine wall.  She also had trouble with the nexplanon.  She has a history of PCOS and prior to pregnancy she had only 1 or 2 menses a year.     ROS: 10 point ROS neg other than the symptoms noted above in the HPI.  Past Medical History:   Diagnosis Date     Anxiety      PCOS (polycystic ovarian syndrome)      Pulmonary embolism (H)    no history of gestational diabetes    Past Surgical History:   Procedure Laterality Date      SECTION  2019    PPROM, 23w3d      SECTION  2009    41 weeks     Family History   Adopted: Yes   Problem Relation Age of Onset     Cerebrovascular Disease Father      Neuropathy Father         secondary to cysts on spinal cord     Cancer Paternal Grandmother          of some tiype of cancer, in upper 60s     Deep Vein Thrombosis No family hx of         patient is adopted but has knowledge of birth father's history     Diabetes No family hx of      Social History:  No tobacco or alcohol use, no history of drug use  Living apart from family as above.    Physical exam:  /78 (BP Location: Right arm, Patient Position: Chair)   Pulse 93   Ht " "1.626 m (5' 4\")   Wt 99.7 kg (219 lb 11.2 oz)   LMP  (LMP Unknown)   BMI 37.71 kg/m    Gen'l: patient is tearful  CV: RRR  Resp: CTA bilaterally  Abd: soft, mildly tender throughout, area of ecchymosis throughout abdomen  Vulva: normal, no lesions, blood matted in hair  Urethra: normal  Vagina: pink, clots filling vaginal vault  Cervix: closed, small amount of active bleeding noted after clots evacuated  Uterus: mobile, NT, anteverted, normal in size, limited by habitus and patient's discomfort with exam.  Adnexa: no palpable masses, NT  Rectum: Anus normal, no rectovaginal exam done    Pelvic ultrasound today: endometrium is thickened with blood flow, cannot rule out retained POCs.    POCT hemoglobin: 12.4  HCG negative 19    30 yo  now 6 weeks postpartum on anticoagulation with AUB  -  reviewed ultrasound findings.  Given patient is s/p classical  less likely retained fragment of placenta.  Certainly given bleeding today, likely clot within the cavity.  Discussed D&C vs trial of medical management.  I prefer trial of medical management given normal hemoglobin, although decreased from earlier today.  - Plan depo provera 150mg now.  This will cover bleeding issue through the length of time she is anticipating needing anticoagulation.  For acute bleeding with use Provera 10mg bid until bleeding slows, then decrease to 10 mg daily for 5 days, then stop.  - Reviewed that if bleeding does not slow with management today to present to ED- [facility].  Patient aware.  - RTC in 2 weeks for f/u ultrasound and visit.    Izabella Marques MD   "

## 2020-01-09 NOTE — LETTER
"2020       RE: Mesha Fields  [address] [address]     Dear Colleague,    Thank you for referring your patient, Mesha Fields, to the [facility] at [facility]. Please see a copy of my visit note below.    28 yo now 6 weeks s/p repeat, classical  section.  Awoke at 3 am this morning with heavy vaginal bleeding.  Bleeding had stopped after 2 weeks of delivery, but had a little bit of bleeding when she started lovenox and warfarin after a diagnosis of a PE. Has soaked through 6 or 7 pads since 3 am.  No urine leakage.  Starting to feel lightheaded, also feels nauseated.  Her hemoglobin earlier today was 13.3, INR 2.07    Patient has had anxiety since her delivery.  With the onset of bleeding today it is \"through the roof\".  She continues to stay at [facility] while her daughter is in the NICU at [facility].  Her  and son (10 years old) are back home in [place], [place].    The patient is not currently using any contraceptive method.  In the past she has tried a Mirena IUD which became imbedded in her uterine wall.  She also had trouble with the nexplanon.  She has a history of PCOS and prior to pregnancy she had only 1 or 2 menses a year.     ROS: 10 point ROS neg other than the symptoms noted above in the HPI.  Past Medical History:   Diagnosis Date     Anxiety      PCOS (polycystic ovarian syndrome)      Pulmonary embolism (H)    no history of gestational diabetes    Past Surgical History:   Procedure Laterality Date      SECTION  2019    PPROM, 23w3d      SECTION  2009    41 weeks     Family History   Adopted: Yes   Problem Relation Age of Onset     Cerebrovascular Disease Father      Neuropathy Father         secondary to cysts on spinal cord     Cancer Paternal Grandmother          of some tiype of cancer, in upper 60s     Deep Vein Thrombosis No family hx of         patient is " "adopted but has knowledge of birth father's history     Diabetes No family hx of      Social History:  No tobacco or alcohol use, no history of drug use  Living apart from family as above.    Physical exam:  /78 (BP Location: Right arm, Patient Position: Chair)   Pulse 93   Ht 1.626 m (5' 4\")   Wt 99.7 kg (219 lb 11.2 oz)   LMP  (LMP Unknown)   BMI 37.71 kg/m     Gen'l: patient is tearful  CV: RRR  Resp: CTA bilaterally  Abd: soft, mildly tender throughout, area of ecchymosis throughout abdomen  Vulva: normal, no lesions, blood matted in hair  Urethra: normal  Vagina: pink, clots filling vaginal vault  Cervix: closed, small amount of active bleeding noted after clots evacuated  Uterus: mobile, NT, anteverted, normal in size, limited by habitus and patient's discomfort with exam.  Adnexa: no palpable masses, NT  Rectum: Anus normal, no rectovaginal exam done    Pelvic ultrasound today: endometrium is thickened with blood flow, cannot rule out retained POCs.    POCT hemoglobin: 12.4  HCG negative 19    28 yo  now 6 weeks postpartum on anticoagulation with AUB  -  reviewed ultrasound findings.  Given patient is s/p classical  less likely retained fragment of placenta.  Certainly given bleeding today, likely clot within the cavity.  Discussed D&C vs trial of medical management.  I prefer trial of medical management given normal hemoglobin, although decreased from earlier today.  - Plan depo provera 150mg now.  This will cover bleeding issue through the length of time she is anticipating needing anticoagulation.  For acute bleeding with use Provera 10mg bid until bleeding slows, then decrease to 10 mg daily for 5 days, then stop.  - Reviewed that if bleeding does not slow with management today to present to ED- [facility].  Patient aware.  - RTC in 2 weeks for f/u ultrasound and visit.    Izabella Marques MD       "

## 2020-01-09 NOTE — PROGRESS NOTES
ANTICOAGULATION FOLLOW-UP CLINIC VISIT    Patient Name:  Mesha Fields  Date:  2020  Contact Type:  Telephone    SUBJECTIVE:  Patient Findings     Comments:   Vaginal Bleeding will be seen in  [facility] today        Clinical Outcomes     Comments:   Vaginal Bleeding will be seen in  [facility] today           OBJECTIVE    INR   Date Value Ref Range Status   2020 2.07 (H) 0.86 - 1.14 Final       ASSESSMENT / PLAN  INR assessment THER    Recheck INR In: 4 DAYS    INR Location Clinic      Anticoagulation Summary  As of 2020    INR goal:   2.0-3.0   TTR:   44.0 % (2.4 wk)   INR used for dosin.07 (2020)   Warfarin maintenance plan:   No maintenance plan   Full warfarin instructions:   : 10 mg; 1/10: 10 mg; : 10 mg; : 10 mg; Otherwise No maintenance plan   Next INR check:   2020   Priority:   High   Target end date:   3/13/2020    Indications    Anticoagulated [Z79.01]  Acute pulmonary embolism  unspecified pulmonary embolism type  unspecified whether acute cor pulmonale present (H) [I26.99]             Anticoagulation Episode Summary     INR check location:       Preferred lab:       Send INR reminders to:    [facility]    Comments:   Emili Perry NP contact #[phone] cell [phone] +++stop date for coumadin: 3/13/20+++      Anticoagulation Care Providers     Provider Role Specialty Phone number    Emili Perry NP Referring [facility] [phone]            See the Encounter Report to view Anticoagulation Flowsheet and Dosing Calendar (Go to Encounters tab in chart review, and find the Anticoagulation Therapy Visit)    Spoke with Emili Perry NP who spoke with Brandan JONES.  Mesha should stop the Lovenox today and continue the warfarin.  Emili will call Mesha and have her continue 10mgs daily and recheck INR on 20    Jaun Bianchi [facility]

## 2020-01-09 NOTE — PATIENT INSTRUCTIONS

## 2020-01-09 NOTE — NURSING NOTE
Clinic Administered Medication Documentation    MEDICATION LIST:   Depo Provera Documentation    Prior to injection, verified patient identity using patient's name and date of birth. Medication was administered. Please see MAR and medication order for additional information. Patient instructed to remain in clinic for 15 minutes.    BP: 113/78    LAST PAP/EXAM: No results found for: PAP  URINE HCG:not indicated    NEXT INJECTION DUE: 3/26/20 - 4/9/20    Was entire vial of medication used? Yes   Vial/Syringe: Single dose vial  Expiration Date:  12/2020      Mireille Dasilva LPN

## 2020-01-09 NOTE — PROGRESS NOTES
"       HPI       Mesha Fields is a 29 year old woman with a history of C section on 11/26/2019 and subsequent bilateral PE s on  12/11/2019. She is currently bridging Coumadin with Lovenox, INR yesterday was not therapeutic. The Lovenox was restarted on 01/06/2020 with an INR of 1.39. She noticed the night after going back on the Lovenox that she had 3 larger than golf ball clots. Last night, she thought she had urinary incontinence when actually she was bleeding a large amount. She is certain this bleeding was vaginal.  She continues to have vaginal bleeding today. She is dizzy// and nauseated. She presents today for exam and evaluation.   Chief Complaint   Patient presents with     Vaginal Problem     Pt complains of vaginal bleeding starting last night.     Problem, Medication and Allergy Lists were reviewed and updated if needed.    Patient is an established patient of this clinic.         Review of Systems:   Review of Systems     Constitutional:  Positive for fatigue. Negative for fever and chills.               Physical Exam:     Vitals:    01/09/20 0849   BP: 121/77   BP Location: Right arm   Patient Position: Sitting   Cuff Size: Adult Large   Pulse: 90   Resp: 16   Temp: 98  F (36.7  C)   TempSrc: Oral   SpO2: 96%   Weight: 100.7 kg (222 lb)   Height: 1.626 m (5' 4\")     Body mass index is 38.11 kg/m .  Vitals were reviewed and were normal.     Physical Exam  Constitutional:       Appearance: Normal appearance.   HENT:      Head: Normocephalic.   Cardiovascular:      Rate and Rhythm: Normal rate and regular rhythm.      Pulses: Normal pulses.      Heart sounds: Normal heart sounds.   Pulmonary:      Effort: Pulmonary effort is normal.      Breath sounds: Normal breath sounds.   Musculoskeletal: Normal range of motion.   Skin:     General: Skin is warm and dry.   Neurological:      General: No focal deficit present.      Mental Status: She is alert and oriented to person, place, and time.   Psychiatric:    " "     Mood and Affect: Mood normal.         Behavior: Behavior normal.         Results:     Labs are pending.     Assessment and Plan     1. Vaginal bleeding    - CBC with platelets; Future  - INR; Future    2. Acute pulmonary embolism, unspecified pulmonary embolism type, unspecified whether acute cor pulmonale present (H)    - D dimer quantitative; Future    There are no discontinued medications.  Total time spent 25 minutes.  More than 50% of the time spent with patient on counseling / coordinating her care. First, Dr. Marques at [facility] graciously agreed to see Mesha today at 1330. Next, I have sent message out to Jomar Cunningham (Hematology clinic) for advice on how to proceed with anticoagulation therap in this setting. I will notfiy Mesha of next steps. She is not scheduled to take another dose of Coumadin or Lovenox until 5 pm today. Options for treatment and follow-up care were reviewed with the patient. Mesha Fields engaged in the decision making process and verbalized understanding of the options discussed and agreed with the final plan.  BERTHA Villegas, CNP  Addendum 1252: First, I had the chance to talk with the Coumadin clinic and Brandan Cunningham (Hematology clinic). The consensus is, due to Mesha having a therapeutic INR today, she will stop the Lovenox and continue on Coumadin at 10 mg daily. She will take Coumadin, 10 mg for the next 4 days. She will have her INR rechecked on 01/13. I will consult with Brandan Cunningham again on 01/13 regarding her INR. Next, Mesha called in a message that she had 3 more large clots and that her left leg was painful from her hip to her foot. Mesha will see OB/GYN at 1330 and call our clinic if she has any additional questions after that. She verbalizes understanding of the plan.   BERTHA Villegas, CNP  Addendum 5280: Mesha came back up to our clinic to report that she \"cannot feel her left leg.\" She goes on to state that this is getting worse all day. She was at " "OB/GYN, had an US and will start on Provera to stop bleeding. She is very worried about all the bleeding, the left leg pain and everything that is going on today. \"I am just terrified about all of this.\" Upon discussion, Mesha will have an US of her left leg. Then she will go home (to the [facility]) and I will call her with the results of the US either tonight or tomorrow. She verbalizes understanding of the plan.   BERTHA Villegas, CNP              "

## 2020-01-09 NOTE — NURSING NOTE
Chief Complaint   Patient presents with     Vaginal Problem     Pt complains of vaginal bleeding starting last night.         Deon Yu, EMT on 1/9/2020 at 8:49 AM

## 2020-01-10 ASSESSMENT — ANXIETY QUESTIONNAIRES: GAD7 TOTAL SCORE: 20

## 2020-01-13 ENCOUNTER — ANTICOAGULATION THERAPY VISIT (OUTPATIENT)
Dept: ANTICOAGULATION | Facility: CLINIC | Age: 30
End: 2020-01-13

## 2020-01-13 DIAGNOSIS — Z79.01 ANTICOAGULATED: ICD-10-CM

## 2020-01-13 DIAGNOSIS — I26.99 ACUTE PULMONARY EMBOLISM, UNSPECIFIED PULMONARY EMBOLISM TYPE, UNSPECIFIED WHETHER ACUTE COR PULMONALE PRESENT (H): ICD-10-CM

## 2020-01-13 LAB — INR PPP: 2.61 (ref 0.86–1.14)

## 2020-01-13 NOTE — PROGRESS NOTES
ANTICOAGULATION FOLLOW-UP CLINIC VISIT    Patient Name:  Mesha Fields  Date:  2020  Contact Type:  Telephone    SUBJECTIVE:  Patient Findings     Comments:   Left message for patient to STOP Lovenox injections and take 10mg daily.  Spoke to Emili Perry.  Will have Mesha drawn on  at next appointment with her.        Clinical Outcomes     Comments:   Left message for patient to STOP Lovenox injections and take 10mg daily.  Spoke to Emili Perry.  Will have Mesha drawn on  at next appointment with her.           OBJECTIVE    INR   Date Value Ref Range Status   2020 2.61 (H) 0.86 - 1.14 Final       ASSESSMENT / PLAN  INR assessment THER    Recheck INR In: 4 DAYS    INR Location Clinic      Anticoagulation Summary  As of 2020    INR goal:   2.0-3.0   TTR:   54.6 % (3 wk)   INR used for dosin.61 (2020)   Warfarin maintenance plan:   No maintenance plan   Full warfarin instructions:   : 10 mg; : 10 mg; 1/15: 10 mg; : 10 mg; Otherwise No maintenance plan   Next INR check:   2020   Priority:   High   Target end date:   3/13/2020    Indications    Anticoagulated [Z79.01]  Acute pulmonary embolism  unspecified pulmonary embolism type  unspecified whether acute cor pulmonale present (H) [I26.99]             Anticoagulation Episode Summary     INR check location:       Preferred lab:       Send INR reminders to:   [facility]    Comments:   Emili Perry NP contact #[phone] cell [phone] +++stop date for coumadin: 3/13/20+++      Anticoagulation Care Providers     Provider Role Specialty Phone number    Emili Perry NP Referring Family Practice [phone]            See the Encounter Report to view Anticoagulation Flowsheet and Dosing Calendar (Go to Encounters tab in chart review, and find the Anticoagulation Therapy Visit)    INR/CFX/F2 RESULT: INR result is 2.61    ASSESSMENT: Left message for Mesha.  Spoke to Emili Perry.    DOSING ADJUSTMENT:  Recommended 10mg daily    NEXT INR/FACTOR X OR FACTOR II:1/17    PROTOCOL FOLLOWED:goal 2-3    Bakari Woodard, RN

## 2020-01-17 ENCOUNTER — DOCUMENTATION ONLY (OUTPATIENT)
Dept: FAMILY MEDICINE | Facility: CLINIC | Age: 30
End: 2020-01-17

## 2020-01-17 ENCOUNTER — OFFICE VISIT (OUTPATIENT)
Dept: FAMILY MEDICINE | Facility: CLINIC | Age: 30
End: 2020-01-17
Payer: COMMERCIAL

## 2020-01-17 ENCOUNTER — TRANSFERRED RECORDS (OUTPATIENT)
Dept: HEALTH INFORMATION MANAGEMENT | Facility: CLINIC | Age: 30
End: 2020-01-17

## 2020-01-17 ENCOUNTER — ANTICOAGULATION THERAPY VISIT (OUTPATIENT)
Dept: ANTICOAGULATION | Facility: CLINIC | Age: 30
End: 2020-01-17

## 2020-01-17 VITALS
HEART RATE: 127 BPM | OXYGEN SATURATION: 97 % | WEIGHT: 219 LBS | TEMPERATURE: 98 F | SYSTOLIC BLOOD PRESSURE: 122 MMHG | BODY MASS INDEX: 37.39 KG/M2 | RESPIRATION RATE: 16 BRPM | HEIGHT: 64 IN | DIASTOLIC BLOOD PRESSURE: 83 MMHG

## 2020-01-17 DIAGNOSIS — I26.99 ACUTE PULMONARY EMBOLISM, UNSPECIFIED PULMONARY EMBOLISM TYPE, UNSPECIFIED WHETHER ACUTE COR PULMONALE PRESENT (H): ICD-10-CM

## 2020-01-17 DIAGNOSIS — Z79.01 ANTICOAGULATED: ICD-10-CM

## 2020-01-17 DIAGNOSIS — N93.9 ABNORMAL UTERINE BLEEDING (AUB): Primary | ICD-10-CM

## 2020-01-17 LAB — INR PPP: 2.66 (ref 0.86–1.14)

## 2020-01-17 ASSESSMENT — ANXIETY QUESTIONNAIRES
GAD7 TOTAL SCORE: 17
GAD7 TOTAL SCORE: 17
6. BECOMING EASILY ANNOYED OR IRRITABLE: NEARLY EVERY DAY
5. BEING SO RESTLESS THAT IT IS HARD TO SIT STILL: SEVERAL DAYS
4. TROUBLE RELAXING: MORE THAN HALF THE DAYS
7. FEELING AFRAID AS IF SOMETHING AWFUL MIGHT HAPPEN: NEARLY EVERY DAY
1. FEELING NERVOUS, ANXIOUS, OR ON EDGE: MORE THAN HALF THE DAYS
2. NOT BEING ABLE TO STOP OR CONTROL WORRYING: NEARLY EVERY DAY
7. FEELING AFRAID AS IF SOMETHING AWFUL MIGHT HAPPEN: NEARLY EVERY DAY
3. WORRYING TOO MUCH ABOUT DIFFERENT THINGS: NEARLY EVERY DAY

## 2020-01-17 ASSESSMENT — PAIN SCALES - GENERAL: PAINLEVEL: NO PAIN (0)

## 2020-01-17 ASSESSMENT — MIFFLIN-ST. JEOR: SCORE: 1698.38

## 2020-01-17 ASSESSMENT — ENCOUNTER SYMPTOMS
CHILLS: 0
FEVER: 0
FATIGUE: 0

## 2020-01-17 NOTE — PATIENT INSTRUCTIONS

## 2020-01-17 NOTE — PROGRESS NOTES
"       HPI       Mesha Fields is a 29 year old woman who presents for follow up on vaginal bleeding and anticoagulation. Mesha has continued on her Provera twice daily. Bleeding slowed down yesterday, today, it is starting up again. INR is stable at 2.6 on Coumadin 10 mg daily.   Chief Complaint   Patient presents with     Recheck Medication     Pt comes in for a follow up for vaginal bleeding.     Problem, Medication and Allergy Lists were reviewed and updated if needed.    Patient is an established patient of this clinic.         Review of Systems:   Review of Systems     Constitutional:  Negative for fever, chills and fatigue.               Physical Exam:     Vitals:    01/17/20 1405   BP: 122/83   BP Location: Left arm   Patient Position: Sitting   Cuff Size: Adult Large   Pulse: 127   Resp: 16   Temp: 98  F (36.7  C)   TempSrc: Oral   SpO2: 97%   Weight: 99.3 kg (219 lb)   Height: 1.626 m (5' 4\")     Body mass index is 37.59 kg/m .  Vitals were reviewed and were normal.     Physical Exam  Constitutional:       Appearance: Normal appearance.   HENT:      Head: Normocephalic.   Musculoskeletal: Normal range of motion.   Skin:     General: Skin is warm and dry.   Neurological:      General: No focal deficit present.      Mental Status: She is alert and oriented to person, place, and time.   Psychiatric:         Mood and Affect: Mood normal.         Behavior: Behavior normal.         Results:     No diagnostics ordered today.     Assessment and Plan     1. Abnormal uterine bleeding (AUB)    There are no discontinued medications.  Actually, during her visit, I ended up needing to call a rapid response, Mesha was transferred to [facility] for excessive vaginal bleeding, feeling dizzy, pain/pressure with multiple clots that came out in bathroom.I called and spoke with a physician at [facility]. I also called Duy her significant other and updated him on Mesha's status per her request. She has been transported off of our " floor as of 251 pm.   Emili Perry, APRN, CNP

## 2020-01-17 NOTE — PROGRESS NOTES
It appears that patient was transferred to [facility] so that she is closer to her child that is in the NICU at [facility].  I will place pt on our hospitalized list and F/U on Monday.

## 2020-01-17 NOTE — NURSING NOTE
Chief Complaint   Patient presents with     Recheck Medication     Pt comes in for a follow up for vaginal bleeding.         MALIK Olea on 1/17/2020 at 2:06 PM

## 2020-01-17 NOTE — PROGRESS NOTES
Rapid Response Epic Documentation     Situation: Pt in NP clinic for appt. Postpartum PE's and medication F/U. Pt bleeding from vagina and passing extensive clots. Clinic called 911/RRT     Objective:      Pt in clinic having active bleeding from vaginal area with extensive clots.     Assessment: Pt alert and oriented sitting on toilet. Pt moist and pale skin, c/o abd pain with constant bleeding from vaginal area. Pt states she has the most pain with passing of clots. Pt has soaked 3 pads in 1 hour and has filled the toilet with dark red blood/clots in a 10-15 min time. Pt has been on warfarin to treat PE's.     Pulse: 127  Respiration: 22  SPO2: 98 %    Mental Status: Alert  CMS: Intact  Stroke Scale: Negative  EKG: Not Performed    Unable to obtain B/P.     Plan:    IV: Size 18 gauge,  Location L forearm via ultrasound RN.       Location: NP clinic    Disposition:      Transfer to Emergency Room Via: 911, Pt request [facility] to be closer to her child in NICU at [facility].     Protocol Used:     Other Bleeding

## 2020-01-18 ENCOUNTER — TRANSFERRED RECORDS (OUTPATIENT)
Dept: HEALTH INFORMATION MANAGEMENT | Facility: CLINIC | Age: 30
End: 2020-01-18

## 2020-01-18 LAB — INR PPP: 1.7 (ref 0.9–1.1)

## 2020-01-18 ASSESSMENT — ANXIETY QUESTIONNAIRES: GAD7 TOTAL SCORE: 17

## 2020-01-19 LAB — INR PPP: 2.3 (ref 0.9–1.1)

## 2020-01-20 ENCOUNTER — ANTICOAGULATION THERAPY VISIT (OUTPATIENT)
Dept: ANTICOAGULATION | Facility: CLINIC | Age: 30
End: 2020-01-20

## 2020-01-20 DIAGNOSIS — Z79.01 ANTICOAGULATED: ICD-10-CM

## 2020-01-20 DIAGNOSIS — I26.99 ACUTE PULMONARY EMBOLISM, UNSPECIFIED PULMONARY EMBOLISM TYPE, UNSPECIFIED WHETHER ACUTE COR PULMONALE PRESENT (H): ICD-10-CM

## 2020-01-20 NOTE — PROGRESS NOTES
ANTICOAGULATION  MANAGEMENT: Discharge Review    Mesha Fields chart reviewed for anticoagulation continuity of care    Hospital Admission on 1/17/1/19 for abnormal vaginal bleeding.Pt was admitted to [facility] . On 1/18 a D and C was done.  Discharge disposition: Home    INR Results:    Recent Labs   Lab Test 01/19/20 01/18/20 01/17/20  1347 01/13/20  1105 01/09/20  0914 01/08/20  1059 01/06/20  1537   INR 2.3* 1.7* 2.66* 2.61* 2.07* 1.72* 1.39*       Warfarin inpatient management: held warfarin due to abnormal vaginal bleeding  and resumed on 1/21    Warfarin discharge instructions: Next INR 1/21     Medication Changes Affecting Anticoagulation: No    Additional Factors Affecting Anticoagulation: No    Plan     Agree with dosing adjustment on discharge    Spoke with patient.    Anticoagulation Calendar updated    Naoim Byers RN          ,it

## 2020-01-21 ENCOUNTER — HOSPITAL ENCOUNTER (OUTPATIENT)
Facility: CLINIC | Age: 30
Setting detail: SPECIMEN
Discharge: HOME | End: 2020-01-21
Admitting: NURSE PRACTITIONER
Payer: COMMERCIAL

## 2020-01-21 ENCOUNTER — ANTICOAGULATION THERAPY VISIT (OUTPATIENT)
Dept: ANTICOAGULATION | Facility: CLINIC | Age: 30
End: 2020-01-21

## 2020-01-21 DIAGNOSIS — I26.99 ACUTE PULMONARY EMBOLISM, UNSPECIFIED PULMONARY EMBOLISM TYPE, UNSPECIFIED WHETHER ACUTE COR PULMONALE PRESENT (H): ICD-10-CM

## 2020-01-21 DIAGNOSIS — Z79.01 ANTICOAGULATED: ICD-10-CM

## 2020-01-21 LAB — INR PPP: 1.12 (ref 0.86–1.14)

## 2020-01-21 PROCEDURE — 85610 PROTHROMBIN TIME: CPT | Performed by: NURSE PRACTITIONER

## 2020-01-21 PROCEDURE — 36415 COLL VENOUS BLD VENIPUNCTURE: CPT | Performed by: NURSE PRACTITIONER

## 2020-01-21 NOTE — PROGRESS NOTES
ANTICOAGULATION FOLLOW-UP CLINIC VISIT    Patient Name:  Mesha Fields  Date:  2020  Contact Type:  Telephone    SUBJECTIVE:  Patient Findings     Positives:   Signs/symptoms of bleeding (Slight amt of vaginal bleeding after wiping.)             OBJECTIVE    INR   Date Value Ref Range Status   2020 1.12 0.86 - 1.14 Final       ASSESSMENT / PLAN  INR assessment SUB    Recheck INR In: 3 DAYS    INR Location Clinic      Anticoagulation Summary  As of 2020    INR goal:   2.0-3.0   TTR:   58.4 % (4.1 wk)   INR used for dosin.12! (2020)   Warfarin maintenance plan:   No maintenance plan   Full warfarin instructions:   : 10 mg; : 10 mg; : 10 mg; Otherwise No maintenance plan   Next INR check:   2020   Priority:   High   Target end date:   3/13/2020    Indications    Anticoagulated [Z79.01]  Acute pulmonary embolism  unspecified pulmonary embolism type  unspecified whether acute cor pulmonale present (H) [I26.99]             Anticoagulation Episode Summary     INR check location:       Preferred lab:       Send INR reminders to:   [facility]    Comments:   Emili Perry NP contact #[phone] cell [phone] +++stop date for coumadin: 3/13/20+++      Anticoagulation Care Providers     Provider Role Specialty Phone number    Emili Perry NP Referring [facility] [phone]            See the Encounter Report to view Anticoagulation Flowsheet and Dosing Calendar (Go to Encounters tab in chart review, and find the Anticoagulation Therapy Visit)  Spoke with patient, and spoke with INEZ Mock. No Lovenox is to be used. Pt's baby is still in the NICU, but is gaining wt, and is about #3 now.  Discussed with Pharmacist Lorenzo Bianchi [facility] for patient's new Anticoagulation recommendation.  Naomi Byers RN

## 2020-01-23 ENCOUNTER — TELEPHONE (OUTPATIENT)
Dept: FAMILY MEDICINE | Facility: CLINIC | Age: 30
End: 2020-01-23

## 2020-01-23 ENCOUNTER — TELEPHONE (OUTPATIENT)
Dept: OBGYN | Facility: CLINIC | Age: 30
End: 2020-01-23

## 2020-01-23 DIAGNOSIS — I26.99 OTHER ACUTE PULMONARY EMBOLISM, UNSPECIFIED WHETHER ACUTE COR PULMONALE PRESENT (H): Primary | ICD-10-CM

## 2020-01-23 NOTE — TELEPHONE ENCOUNTER
Spoke with Mesha who is 8 weeks post classical  and had heavy bleeding. Also had PE. Saw Dr. Marques for continued heavy bleeding on . Was given depot-Provera and plan was to follow up in 2 weeks for US and visit. Spoke with patient and she states that while she was at [facility] visiting her baby this weekend she had very heavy bleeding again and D&C was performed on . The D&C had stopped her bleeding until today again. She reports small pea sized clots. Denies any heavy bleeding or pain. Does endorse mild cramping. Patient is staying here at [facility] while [facility] in N[facility] [place]. She called her PCP and they recommended to call [facility] for advice.     Will route to on call provider.

## 2020-01-23 NOTE — TELEPHONE ENCOUNTER
I called and left a message for Mesha to call us back and give us an update on her status.   BERTHA Villegas, CNP

## 2020-01-24 ENCOUNTER — ANTICOAGULATION THERAPY VISIT (OUTPATIENT)
Dept: ANTICOAGULATION | Facility: CLINIC | Age: 30
End: 2020-01-24

## 2020-01-24 DIAGNOSIS — I26.99 ACUTE PULMONARY EMBOLISM, UNSPECIFIED PULMONARY EMBOLISM TYPE, UNSPECIFIED WHETHER ACUTE COR PULMONALE PRESENT (H): ICD-10-CM

## 2020-01-24 DIAGNOSIS — Z79.01 ANTICOAGULATED: ICD-10-CM

## 2020-01-24 LAB — INR PPP: 1.19 (ref 0.86–1.14)

## 2020-01-24 NOTE — PROGRESS NOTES
ANTICOAGULATION FOLLOW-UP CLINIC VISIT    Patient Name:  Mesha Fields  Date:  2020  Contact Type:  Telephone    SUBJECTIVE:  Patient Findings     Positives:   Signs/symptoms of bleeding (Mesha reports she started bleeding (vaginal) again yesterday, 2020)    Comments:   Mesha was admitted to [facility] on 2020 for abnormal vaginal bleeding.  Mesha is waiting for a call back from her OB/GYN provider--she called them about the vaginal bleeding she is having.  She had a D and C done 2020 and bleeding had stopped, started again 2020.  She is not on lovenox.   Asked Mesha to call the [facility] if any changes are made to her warfarin plan.        Clinical Outcomes     Comments:   Mesha was admitted to [facility] on 2020 for abnormal vaginal bleeding.  Mesha is waiting for a call back from her OB/GYN provider--she called them about the vaginal bleeding she is having.  She had a D and C done 2020 and bleeding had stopped, started again 2020.  She is not on lovenox.   Asked Mesha to call the [facility] if any changes are made to her warfarin plan.           OBJECTIVE    INR   Date Value Ref Range Status   2020 1.19 (H) 0.86 - 1.14 Final       ASSESSMENT / PLAN  INR assessment SUB    Recheck INR In: 3 DAYS    INR Location Clinic      Anticoagulation Summary  As of 2020    INR goal:   2.0-3.0   TTR:   53.1 % (1.1 mo)   INR used for dosin.19! (2020)   Warfarin maintenance plan:   No maintenance plan   Full warfarin instructions:   : 10 mg; : 10 mg; : 10 mg; Otherwise No maintenance plan   Next INR check:   2020   Priority:   High   Target end date:   3/13/2020    Indications    Anticoagulated [Z79.01]  Acute pulmonary embolism  unspecified pulmonary embolism type  unspecified whether acute cor pulmonale present (H) [I26.99]             Anticoagulation Episode Summary     INR check location:       Preferred lab:       Send INR reminders to:   [facility]    Comments:   Emili Perry NP contact #[phone] cell [phone] +++stop date for coumadin: 3/13/20+++      Anticoagulation Care Providers     Provider Role Specialty Phone number    Emili Perry, NP Referring [facility] [phone]            See the Encounter Report to view Anticoagulation Flowsheet and Dosing Calendar (Go to Encounters tab in chart review, and find the Anticoagulation Therapy Visit)    Spoke with Mesha.  See patient findings.    Verna Rojo RN

## 2020-01-27 ENCOUNTER — TELEPHONE (OUTPATIENT)
Dept: ONCOLOGY | Facility: CLINIC | Age: 30
End: 2020-01-27

## 2020-01-27 NOTE — TELEPHONE ENCOUNTER
Called patient.Left  with hours and phone. Letter sent for follow up. Referral in [facility].

## 2020-01-30 ENCOUNTER — TELEPHONE (OUTPATIENT)
Dept: FAMILY MEDICINE | Facility: CLINIC | Age: 30
End: 2020-01-30

## 2020-01-30 ENCOUNTER — ANTICOAGULATION THERAPY VISIT (OUTPATIENT)
Dept: ANTICOAGULATION | Facility: CLINIC | Age: 30
End: 2020-01-30

## 2020-01-30 DIAGNOSIS — I26.99 ACUTE PULMONARY EMBOLISM, UNSPECIFIED PULMONARY EMBOLISM TYPE, UNSPECIFIED WHETHER ACUTE COR PULMONALE PRESENT (H): ICD-10-CM

## 2020-01-30 DIAGNOSIS — Z79.01 ANTICOAGULATED: ICD-10-CM

## 2020-01-30 LAB — INR PPP: 3.11 (ref 0.86–1.14)

## 2020-01-30 NOTE — PROGRESS NOTES
ANTICOAGULATION FOLLOW-UP CLINIC VISIT    Patient Name:  Mesha Fields  Date:  1/30/2020  Contact Type:  Telephone    SUBJECTIVE:  Patient Findings     Comments:   Pt reports she has been taking 10mg daily. Denies anymore bleeding. Updated the calendar with dosing. Consulted with Lorenzo Bianchi RPH        Clinical Outcomes     Negatives:   Major bleeding event, Thromboembolic event, Anticoagulation-related hospital admission, Anticoagulation-related ED visit, Anticoagulation-related fatality    Comments:   Pt reports she has been taking 10mg daily. Denies anymore bleeding. Updated the calendar with dosing. Consulted with Lorenzo Bianchi RPH           OBJECTIVE    INR   Date Value Ref Range Status   01/30/2020 3.11 (H) 0.86 - 1.14 Final       ASSESSMENT / PLAN  INR assessment SUPRA    Recheck INR In: 1 WEEK    INR Location Clinic      Anticoagulation Summary  As of 1/30/2020    INR goal:   2.0-3.0   TTR:   53.0 % (1.3 mo)   INR used for dosing:   3.11! (1/30/2020)   Warfarin maintenance plan:   No maintenance plan   Full warfarin instructions:   1/30: 7.5 mg; 1/31: 10 mg; 2/1: 10 mg; 2/2: 10 mg; 2/3: 7.5 mg; 2/4: 10 mg; 2/5: 10 mg; Otherwise No maintenance plan   Next INR check:   2/6/2020   Priority:   High   Target end date:   3/13/2020    Indications    Anticoagulated [Z79.01]  Acute pulmonary embolism  unspecified pulmonary embolism type  unspecified whether acute cor pulmonale present (H) [I26.99]             Anticoagulation Episode Summary     INR check location:       Preferred lab:       Send INR reminders to:   [facility]    Comments:   Emili Perry NP contact #[phone] cell [phone] +++stop date for coumadin: 3/13/20+++      Anticoagulation Care Providers     Provider Role Specialty Phone number    Emili Perry NP Referring [facility] [phone]            See the Encounter Report to view Anticoagulation Flowsheet and Dosing Calendar (Go to Encounters tab in chart review, and find the  Anticoagulation Therapy Visit)    Spoke with patient. Gave them their lab results and new warfarin recommendation.  No changes in health, medication, or diet. No missed doses, no falls. No signs or symptoms of bleed or clotting.     Nesha Mclean RN

## 2020-01-30 NOTE — TELEPHONE ENCOUNTER
I called Mesha to talk about her current status. INR was elevated today at 3.11. She will take a lower dose of Coumadin as directed by the Coumadin clinic. She reported to them that she is no longer having vaginal bleeding. I left her a message and told her I was calling about her status and if she has any concerns to call me back with the clinic number provided.   Emili Perry, BERTHA, CNP

## 2020-02-06 ENCOUNTER — ANTICOAGULATION THERAPY VISIT (OUTPATIENT)
Dept: ANTICOAGULATION | Facility: CLINIC | Age: 30
End: 2020-02-06

## 2020-02-06 DIAGNOSIS — I26.99 ACUTE PULMONARY EMBOLISM, UNSPECIFIED PULMONARY EMBOLISM TYPE, UNSPECIFIED WHETHER ACUTE COR PULMONALE PRESENT (H): ICD-10-CM

## 2020-02-06 DIAGNOSIS — Z79.01 ANTICOAGULATED: ICD-10-CM

## 2020-02-06 LAB — INR PPP: 2.57 (ref 0.86–1.14)

## 2020-02-06 NOTE — PROGRESS NOTES
ANTICOAGULATION FOLLOW-UP CLINIC VISIT    Patient Name:  Mesha Fields  Date:  2020  Contact Type:  Telephone    SUBJECTIVE:         OBJECTIVE    INR   Date Value Ref Range Status   2020 2.57 (H) 0.86 - 1.14 Final       ASSESSMENT / PLAN  No question data found.  Anticoagulation Summary  As of 2020    INR goal:   2.0-3.0   TTR:   57.1 % (1.5 mo)   INR used for dosin.57 (2020)   Warfarin maintenance plan:   7.5 mg (5 mg x 1.5) every Mon, Thu; 10 mg (5 mg x 2) all other days   Full warfarin instructions:   7.5 mg every Mon, Thu; 10 mg all other days   Weekly warfarin total:   65 mg   Plan last modified:   Laura Ayers RN (2020)   Next INR check:   2020   Priority:   High   Target end date:   3/13/2020    Indications    Anticoagulated [Z79.01]  Acute pulmonary embolism  unspecified pulmonary embolism type  unspecified whether acute cor pulmonale present (H) [I26.99]             Anticoagulation Episode Summary     INR check location:       Preferred lab:       Send INR reminders to:   [facility]    Comments:   Emili Perry NP contact #[phone] cell [phone] +++stop date for coumadin: 3/13/20+++      Anticoagulation Care Providers     Provider Role Specialty Phone number    Emili Perry NP Referring [facility] [phone]            See the Encounter Report to view Anticoagulation Flowsheet and Dosing Calendar (Go to Encounters tab in chart review, and find the Anticoagulation Therapy Visit)    Left message for patient with results and dosing recommendations. Asked patient to call back to report any missed doses, falls, signs and symptoms of bleeding or clotting, any changes in health, medication, or diet. Asked patient to call back with any questions or concerns.     Laura Ayers RN

## 2020-02-15 ENCOUNTER — TRANSFERRED RECORDS (OUTPATIENT)
Dept: HEALTH INFORMATION MANAGEMENT | Facility: CLINIC | Age: 30
End: 2020-02-15

## 2020-02-15 LAB
CREAT SERPL-MCNC: 0.74 MG/DL (ref 0.57–1.11)
GFR SERPL CREATININE-BSD FRML MDRD: >60 ML/MIN/1.73M2
GLUCOSE SERPL-MCNC: 88 MG/DL (ref 65–100)
INR PPP: 1
POTASSIUM SERPL-SCNC: 4.2 MMOL/L (ref 3.5–5)

## 2020-03-17 ENCOUNTER — TELEPHONE (OUTPATIENT)
Dept: FAMILY MEDICINE | Facility: CLINIC | Age: 30
End: 2020-03-17

## 2020-03-17 NOTE — TELEPHONE ENCOUNTER
[facility] Call Center    Phone Message    May a detailed message be left on voicemail: yes     Reason for Call: Other: Pt is requesting a call back.  She states her anxiety medication (she didnt' know the name) isn't working and is wondering if she should change it or the dose.  Please follow up with the Pt.      Action Taken: Message routed to:  [facility] NP clinic    Travel Screening: Not Applicable

## 2020-03-18 ENCOUNTER — TRANSFERRED RECORDS (OUTPATIENT)
Dept: HEALTH INFORMATION MANAGEMENT | Facility: CLINIC | Age: 30
End: 2020-03-18

## 2020-03-18 LAB
ALT SERPL-CCNC: 41 IU/L (ref 8–45)
AST SERPL-CCNC: 23 IU/L (ref 2–40)
CREAT SERPL-MCNC: 0.86 MG/DL (ref 0.57–1.11)
GFR SERPL CREATININE-BSD FRML MDRD: >60 ML/MIN/1.73M2
GLUCOSE SERPL-MCNC: 87 MG/DL (ref 65–100)
PHQ9 SCORE: 23
POTASSIUM SERPL-SCNC: 3.9 MMOL/L (ref 3.5–5)

## 2020-03-18 NOTE — PROGRESS NOTES
Addendum 3/18/20    Left a message for patient to call the [facility]. Has she stopped her warfarin?    Lorenzo Bianchi [facility]

## 2020-03-19 ENCOUNTER — TRANSFERRED RECORDS (OUTPATIENT)
Dept: HEALTH INFORMATION MANAGEMENT | Facility: CLINIC | Age: 30
End: 2020-03-19

## 2020-03-20 ENCOUNTER — TELEPHONE (OUTPATIENT)
Dept: FAMILY MEDICINE | Facility: CLINIC | Age: 30
End: 2020-03-20

## 2020-03-20 NOTE — TELEPHONE ENCOUNTER
Please call clinic to schedule a telephone visit to discuss medication concerns.     MILDRED Mcgee 11:10 AM  3/20/2020

## 2020-03-23 ENCOUNTER — ANTICOAGULATION THERAPY VISIT (OUTPATIENT)
Dept: ANTICOAGULATION | Facility: CLINIC | Age: 30
End: 2020-03-23

## 2020-03-23 DIAGNOSIS — Z79.01 ANTICOAGULATED: ICD-10-CM

## 2020-03-23 DIAGNOSIS — I26.99 ACUTE PULMONARY EMBOLISM, UNSPECIFIED PULMONARY EMBOLISM TYPE, UNSPECIFIED WHETHER ACUTE COR PULMONALE PRESENT (H): ICD-10-CM

## 2020-03-23 NOTE — PROGRESS NOTES
I spoke with Mesha today she said she was put on Lovenox by an ED Physician. Below is a partial Epic note from 2/15/20. I also sent a message to Emili Perry NP    Discharge Instructions   As we discussed today your MRI looks good, you have a benign growth called a meningioma. The ultrasound today shows no blood clot but I'd like you to restart the lovenox until you follow up with your primary at the [facility]. Use the meclizine as needed for dizziness.     Discharge References/Attachments   Meningioma (English)  Benign Positional Vertigo (English)      Follow-up Information   Follow up With Specialties Details Why Contact Info   Emili Perry, NP Family Practice Schedule an appointment as soon as possible for a visit in 1 week [address] [place] [address]   [facility] [id number]  [phone]        Sulema Rodriguez MD    2/15/2020  [facility] EMERGENCY DEPARTMENT

## 2020-04-11 ENCOUNTER — TRANSFERRED RECORDS (OUTPATIENT)
Dept: HEALTH INFORMATION MANAGEMENT | Facility: CLINIC | Age: 30
End: 2020-04-11

## 2020-04-11 LAB — INR PPP: 1.1

## 2020-04-22 ENCOUNTER — TRANSFERRED RECORDS (OUTPATIENT)
Dept: HEALTH INFORMATION MANAGEMENT | Facility: CLINIC | Age: 30
End: 2020-04-22

## 2020-04-24 ENCOUNTER — TELEPHONE (OUTPATIENT)
Dept: FAMILY MEDICINE | Facility: CLINIC | Age: 30
End: 2020-04-24

## 2020-04-24 NOTE — TELEPHONE ENCOUNTER
[facility] Call Center    Phone Message    May a detailed message be left on voicemail: yes     Reason for Call: Medication Question or concern regarding medication   Prescription Clarification  Name of Medication: Prazosin  Prescribing Provider: Sapphire Mock   Pharmacy: n/a   What on the order needs clarification? Connor Fernandez is wanting to get a call back in regards to Dr. Reynoso is wanting to know if Vicky Mock has any concerns with Patient taking the medication for PTSD related nightmare due to Patients medical history.          Action Taken: Message routed to:  [facility] Np    Travel Screening: Not Applicable

## 2020-05-06 NOTE — TELEPHONE ENCOUNTER
Called left message for patient to call the clinic to set up a video follow up visit with Emili Perry. She is past do for a follow up visit.     Samara Johnson, CMA

## 2021-01-04 ENCOUNTER — HEALTH MAINTENANCE LETTER (OUTPATIENT)
Age: 31
End: 2021-01-04

## 2021-10-10 ENCOUNTER — HEALTH MAINTENANCE LETTER (OUTPATIENT)
Age: 31
End: 2021-10-10

## 2022-01-11 ENCOUNTER — TELEPHONE (OUTPATIENT)
Dept: PSYCHIATRY | Facility: OTHER | Age: 32
End: 2022-01-11

## 2022-01-11 NOTE — TELEPHONE ENCOUNTER
----- Message from Sally Riuz sent at 1/11/2022  3:13 PM CST -----  This patient called me today and left a voicemail stating she wanted to schedule with Bertha Milligan. I called her and she does not have a primary with us and it does not look like there is a referral in for her. She states that her daughters nurse recommended Bertha. I am not sure where to go with this.       Please and thank you for your help.

## 2022-01-11 NOTE — TELEPHONE ENCOUNTER
Writer spoke to patient to discuss what kind of services she is looking for - patient states she is looking for counseling/therapy as she already has a provider for medication management. Writer advised patient that Bertha Milligan primarily sees patients to manage medication, but did advise we have therapists who can review patient's request.    Patient states she lives in the [facility] and would be interested in seeing Micah Webster for anxiety, depression, PTSD in the [facility] area.     Message sent to Micah for review.

## 2022-01-13 NOTE — TELEPHONE ENCOUNTER
Micah declined - [facility] case load is full. Writer notified patient she will need to find a community provider who is accepting, and offered to mail out  resource list so that she may have options. Patient verbalized understanding and appreciated the help.

## 2022-01-30 ENCOUNTER — HEALTH MAINTENANCE LETTER (OUTPATIENT)
Age: 32
End: 2022-01-30

## 2022-09-18 ENCOUNTER — HEALTH MAINTENANCE LETTER (OUTPATIENT)
Age: 32
End: 2022-09-18

## 2023-05-07 ENCOUNTER — HEALTH MAINTENANCE LETTER (OUTPATIENT)
Age: 33
End: 2023-05-07

## 2024-12-01 ENCOUNTER — HEALTH MAINTENANCE LETTER (OUTPATIENT)
Age: 34
End: 2024-12-01

## 2024-12-23 NOTE — PROGRESS NOTES
"  Assessment & Plan     1. Encounter to establish care (Primary)  Previous notes, ED records reviewed.     2. Excess skin  Referral to [facility] for evaluation.    - Adult Plastic Surgery  Referral    3. Symptomatic varicose veins, left  Continue compression stockings.     4. Weight loss  As above  - Adult Plastic Surgery  Referral    5. Attention deficit hyperactivity disorder (ADHD), unspecified ADHD type  Continue following with mental health medication provider.    Continue therapy as scheduled.   - amphetamine-dextroamphetamine (ADDERALL XR) 30 MG 24 hr capsule; Take 1 capsule (30 mg) by mouth daily.  - amphetamine-dextroamphetamine (ADDERALL) 15 MG tablet; Take 1 tablet (15 mg) by mouth 2 times daily.    6. Generalized anxiety disorder  - LORazepam (ATIVAN) 0.5 MG tablet; Take 1 tablet (0.5 mg) by mouth every 6 hours as needed for anxiety.    Medication list updated.          Nicotine/Tobacco Cessation  She reports that she has been smoking vaping device. She has never used smokeless tobacco.  Nicotine/Tobacco Cessation Plan  Information offered: Patient not interested at this time      BMI  Estimated body mass index is 28.68 kg/m  as calculated from the following:    Height as of this encounter: 1.615 m (5' 3.58\").    Weight as of this encounter: 74.8 kg (164 lb 14.5 oz).       Follow-up in 2-3 weeks.      The longitudinal plan of care for the diagnosis(es)/condition(s) as documented were addressed during this visit. Due to the added complexity in care, I will continue to support Mesha in the subsequent management and with ongoing continuity of care.      Ashley Spann,   Certified Adult Nurse Practitioner  [phone]      Subjective   Mesha is a 34 year old, presenting for the following health issues:  Establish Care        12/26/2024    12:53 PM   Additional Questions   Roomed by Melody KENNEY   Accompanied by None         12/26/2024   Declines Weight   Did patient " "decline having their weight taken? Yes     HPI   Establish Care     Weight loss:  highest was 290 pounds has lost 126 pounds.  She was diagnosed with diverticulitis, had colectomy in 2023 and reversal in 2024.  She now has excessive skin that is pulling and causing pain sensation of pulling skin and increased back pain.   She has to wear 3-4 bras for breast compression, and compression tank top to  abdominal area.       Varicose veins:  chronic since age of 9.  She has had previous blood clots, she has worn compression stockings for over 6 months without change in pain.  Is not interested in vascular referral at this time.      Ketamine infusions:  Juan Luis Carias with [facility], last was 2 years ago due to there health issues.  She is on adderall 30mg twice daily.  She also takes adderall 15mg extended release 4times daily according to PDMP.   She continues with weekly therapy.          Review of Systems  Constitutional, neuro, ENT, endocrine, pulmonary, cardiac, gastrointestinal, genitourinary, musculoskeletal, integument and psychiatric systems are negative, except as otherwise noted.      Objective    BP (!) 134/94 (BP Location: Right arm, Patient Position: Sitting, Cuff Size: Adult Large)   Pulse (!) 128   Temp 98.6  F (37  C) (Tympanic)   Resp 18   Ht 1.615 m (5' 3.58\")   Wt 74.8 kg (164 lb 14.5 oz)   LMP 11/20/2024 (Approximate)   SpO2 95%   Breastfeeding No   BMI 28.68 kg/m    Body mass index is 28.68 kg/m .  Physical Exam   GENERAL: alert and no distress  RESP: lungs clear to auscultation - no rales, rhonchi or wheezes  CV: regular rate and rhythm, normal S1 S2, no S3 or S4, no murmur  MS: no gross musculoskeletal defects noted, no edema  PSYCH: mentation appears normal, affect normal/bright            Signed Electronically by: Ashley Spann NP    "

## 2024-12-26 ENCOUNTER — OFFICE VISIT (OUTPATIENT)
Dept: FAMILY MEDICINE | Facility: OTHER | Age: 34
End: 2024-12-26
Attending: NURSE PRACTITIONER
Payer: COMMERCIAL

## 2024-12-26 VITALS
SYSTOLIC BLOOD PRESSURE: 134 MMHG | TEMPERATURE: 98.6 F | OXYGEN SATURATION: 95 % | DIASTOLIC BLOOD PRESSURE: 94 MMHG | WEIGHT: 164.9 LBS | HEART RATE: 128 BPM | BODY MASS INDEX: 28.15 KG/M2 | HEIGHT: 64 IN | RESPIRATION RATE: 18 BRPM

## 2024-12-26 DIAGNOSIS — R63.4 WEIGHT LOSS: ICD-10-CM

## 2024-12-26 DIAGNOSIS — I83.892 SYMPTOMATIC VARICOSE VEINS, LEFT: ICD-10-CM

## 2024-12-26 DIAGNOSIS — F41.1 GENERALIZED ANXIETY DISORDER: ICD-10-CM

## 2024-12-26 DIAGNOSIS — Z76.89 ENCOUNTER TO ESTABLISH CARE: Primary | ICD-10-CM

## 2024-12-26 DIAGNOSIS — L98.7 EXCESS SKIN: ICD-10-CM

## 2024-12-26 DIAGNOSIS — F90.9 ATTENTION DEFICIT HYPERACTIVITY DISORDER (ADHD), UNSPECIFIED ADHD TYPE: ICD-10-CM

## 2024-12-26 PROCEDURE — G0463 HOSPITAL OUTPT CLINIC VISIT: HCPCS

## 2024-12-26 RX ORDER — DEXTROAMPHETAMINE SACCHARATE, AMPHETAMINE ASPARTATE MONOHYDRATE, DEXTROAMPHETAMINE SULFATE AND AMPHETAMINE SULFATE 7.5; 7.5; 7.5; 7.5 MG/1; MG/1; MG/1; MG/1
30 CAPSULE, EXTENDED RELEASE ORAL DAILY
COMMUNITY
Start: 2024-12-26

## 2024-12-26 RX ORDER — LORAZEPAM 0.5 MG/1
0.5 TABLET ORAL EVERY 6 HOURS PRN
COMMUNITY
Start: 2024-12-26

## 2024-12-26 RX ORDER — DEXTROAMPHETAMINE SACCHARATE, AMPHETAMINE ASPARTATE, DEXTROAMPHETAMINE SULFATE AND AMPHETAMINE SULFATE 3.75; 3.75; 3.75; 3.75 MG/1; MG/1; MG/1; MG/1
15 TABLET ORAL 2 TIMES DAILY
COMMUNITY
Start: 2024-12-26

## 2024-12-26 ASSESSMENT — ANXIETY QUESTIONNAIRES
IF YOU CHECKED OFF ANY PROBLEMS ON THIS QUESTIONNAIRE, HOW DIFFICULT HAVE THESE PROBLEMS MADE IT FOR YOU TO DO YOUR WORK, TAKE CARE OF THINGS AT HOME, OR GET ALONG WITH OTHER PEOPLE: VERY DIFFICULT
3. WORRYING TOO MUCH ABOUT DIFFERENT THINGS: NEARLY EVERY DAY
GAD7 TOTAL SCORE: 19
4. TROUBLE RELAXING: NEARLY EVERY DAY
1. FEELING NERVOUS, ANXIOUS, OR ON EDGE: NEARLY EVERY DAY
7. FEELING AFRAID AS IF SOMETHING AWFUL MIGHT HAPPEN: SEVERAL DAYS
5. BEING SO RESTLESS THAT IT IS HARD TO SIT STILL: NEARLY EVERY DAY
6. BECOMING EASILY ANNOYED OR IRRITABLE: NEARLY EVERY DAY
2. NOT BEING ABLE TO STOP OR CONTROL WORRYING: NEARLY EVERY DAY
GAD7 TOTAL SCORE: 19
GAD7 TOTAL SCORE: 19
8. IF YOU CHECKED OFF ANY PROBLEMS, HOW DIFFICULT HAVE THESE MADE IT FOR YOU TO DO YOUR WORK, TAKE CARE OF THINGS AT HOME, OR GET ALONG WITH OTHER PEOPLE?: VERY DIFFICULT
7. FEELING AFRAID AS IF SOMETHING AWFUL MIGHT HAPPEN: SEVERAL DAYS

## 2024-12-26 ASSESSMENT — PATIENT HEALTH QUESTIONNAIRE - PHQ9
SUM OF ALL RESPONSES TO PHQ QUESTIONS 1-9: 8
10. IF YOU CHECKED OFF ANY PROBLEMS, HOW DIFFICULT HAVE THESE PROBLEMS MADE IT FOR YOU TO DO YOUR WORK, TAKE CARE OF THINGS AT HOME, OR GET ALONG WITH OTHER PEOPLE: SOMEWHAT DIFFICULT
SUM OF ALL RESPONSES TO PHQ QUESTIONS 1-9: 8

## 2024-12-26 ASSESSMENT — PAIN SCALES - GENERAL: PAINLEVEL_OUTOF10: WORST PAIN (10)

## 2025-01-13 NOTE — PROGRESS NOTES
"  Assessment & Plan     1. Chronic left shoulder pain (Primary)  - Physical Therapy  Referral; Future    2. Chronic bilateral low back pain with left-sided sciatica  - Physical Therapy  Referral; Future    3. Radicular pain of left lower extremity  - Physical Therapy  Referral; Future    4. Multiple joint pain  - Physical Therapy  Referral; Future  - Erythrocyte sedimentation rate auto; Future  - CRP inflammation; Future  - Rheumatoid factor; Future  - Cyclic Citrullinated Peptide Antibody IgG; Future  - HLA-B27 Typing; Future  - Anti Nuclear Amira IgG by IFA with Reflex; Future    5. Myalgia, multiple sites  - Physical Therapy  Referral; Future  - Erythrocyte sedimentation rate auto; Future  - CRP inflammation; Future  - Rheumatoid factor; Future  - Cyclic Citrullinated Peptide Antibody IgG; Future  - HLA-B27 Typing; Future  - Anti Nuclear Amira IgG by IFA with Reflex; Future    6. Slow transit constipation  Miralax as discussed.      7. PCOS (polycystic ovarian syndrome)  - spironolactone (ALDACTONE) 50 MG tablet; Take 1 tablet (50 mg) by mouth daily.  Dispense: 90 tablet; Refill: 1    8. Cellulitis of left breast  - sulfamethoxazole-trimethoprim (BACTRIM DS) 800-160 MG tablet; Take 1 tablet by mouth 2 times daily for 10 days.  Dispense: 20 tablet; Refill: 0      BMI  Estimated body mass index is 28.17 kg/m  as calculated from the following:    Height as of 12/26/24: 1.615 m (5' 3.58\").    Weight as of this encounter: 73.5 kg (162 lb).   Weight management plan: Discussed healthy diet and exercise guidelines    Will notify of results as they are returned.     Follow-up in 3 months or as needed     The longitudinal plan of care for the diagnosis(es)/condition(s) as documented were addressed during this visit. Due to the added complexity in care, I will continue to support Mesha in the subsequent management and with ongoing continuity of care.    Ashley Spann, " "  Certified Adult Nurse Practitioner  [phone]      Subjective   Mesha is a 34 year old, presenting for the following health issues:  Musculoskeletal Problem    HPI     Pain History:  When did you first notice your pain? years   Have you seen this provider for your pain in the past? Yes   Where in your body do you have pain? Lower,mid,upper back,hips,shoulders  Are you seeing anyone else for your pain? No    LT shoulder pain- \"popped out of socket\" mid November  Patient reports pain in entire left side of body- LT Knee, LT ankle , LT shoulder, LT hip.   Reports previous hip injections at [facility] in the past which seemed to help with the hip pain.     Denies any injury. Reports symptoms started after weight loss, worsening over the last year.     LT Breast: reports lump under nipple area \"ping pong size\" removed nipple piercing and concerned of infection. Denies redness or change in skin temperature, or swelling     She tried a muscle relaxer, made it worse, she is stretching and rice socks.  Ice seems to make it worse.  Tiger, balm, icy hot.  Tylenol and ibuprofen.      PCOS - she has been experiencing excess facial hair growth, would like to try spironolactone.          1/9/2020     2:38 PM 12/26/2024     1:02 PM 1/16/2025    11:01 AM   PHQ-9 SCORE   PHQ-9 Total Score MyChart  8 (Mild depression) 13 (Moderate depression)   PHQ-9 Total Score 14 8  13        Patient-reported           1/9/2020     2:38 PM 1/17/2020     1:58 PM 12/26/2024     1:03 PM   JOCELYNN-7 SCORE   Total Score  17 (severe anxiety) 19 (severe anxiety)   Total Score 20 17 19        Patient-reported       PDMP Review         Value Time User    State PDMP site checked  Yes 12/26/2024  1:32 PM Ashley Spann NP          Last CSA Agreement:   CSA -- Patient Level:    CSA: None found at the patient level.       Last UDS: NA        Review of Systems  Constitutional, neuro, ENT, endocrine, pulmonary, cardiac, gastrointestinal, " genitourinary, musculoskeletal, integument and psychiatric systems are negative, except as otherwise noted.      Objective    /82 (BP Location: Left arm, Patient Position: Sitting, Cuff Size: Adult Regular)   Pulse (!) 134   Temp 98  F (36.7  C) (Tympanic)   Resp 18   Wt 73.5 kg (162 lb)   LMP 12/20/2024 (Approximate)   SpO2 98%   Breastfeeding No   BMI 28.17 kg/m    Body mass index is 28.17 kg/m .  Physical Exam   GENERAL: alert and no distress  MS: no gross musculoskeletal defects noted, no edema  SKIN: left breast is erythematous, nipple tenderness with palpation but no drainage noted.   PSYCH: mentation appears normal, affect normal/bright            Signed Electronically by: Ashley Spann NP

## 2025-01-16 ENCOUNTER — OFFICE VISIT (OUTPATIENT)
Dept: FAMILY MEDICINE | Facility: OTHER | Age: 35
End: 2025-01-16
Attending: NURSE PRACTITIONER
Payer: COMMERCIAL

## 2025-01-16 VITALS
RESPIRATION RATE: 18 BRPM | HEART RATE: 134 BPM | OXYGEN SATURATION: 98 % | WEIGHT: 162 LBS | BODY MASS INDEX: 28.17 KG/M2 | SYSTOLIC BLOOD PRESSURE: 128 MMHG | DIASTOLIC BLOOD PRESSURE: 82 MMHG | TEMPERATURE: 98 F

## 2025-01-16 DIAGNOSIS — K59.01 SLOW TRANSIT CONSTIPATION: ICD-10-CM

## 2025-01-16 DIAGNOSIS — M54.10 RADICULAR PAIN OF LEFT LOWER EXTREMITY: ICD-10-CM

## 2025-01-16 DIAGNOSIS — E28.2 PCOS (POLYCYSTIC OVARIAN SYNDROME): ICD-10-CM

## 2025-01-16 DIAGNOSIS — G89.29 CHRONIC LEFT SHOULDER PAIN: Primary | ICD-10-CM

## 2025-01-16 DIAGNOSIS — M25.50 MULTIPLE JOINT PAIN: ICD-10-CM

## 2025-01-16 DIAGNOSIS — M79.18 MYALGIA, MULTIPLE SITES: ICD-10-CM

## 2025-01-16 DIAGNOSIS — N61.0 CELLULITIS OF LEFT BREAST: ICD-10-CM

## 2025-01-16 DIAGNOSIS — M54.42 CHRONIC BILATERAL LOW BACK PAIN WITH LEFT-SIDED SCIATICA: ICD-10-CM

## 2025-01-16 DIAGNOSIS — M25.512 CHRONIC LEFT SHOULDER PAIN: Primary | ICD-10-CM

## 2025-01-16 DIAGNOSIS — G89.29 CHRONIC BILATERAL LOW BACK PAIN WITH LEFT-SIDED SCIATICA: ICD-10-CM

## 2025-01-16 LAB
CRP SERPL-MCNC: <3 MG/L
ERYTHROCYTE [SEDIMENTATION RATE] IN BLOOD BY WESTERGREN METHOD: 14 MM/HR (ref 0–20)

## 2025-01-16 PROCEDURE — G0463 HOSPITAL OUTPT CLINIC VISIT: HCPCS

## 2025-01-16 PROCEDURE — 85652 RBC SED RATE AUTOMATED: CPT | Mod: ZL | Performed by: NURSE PRACTITIONER

## 2025-01-16 PROCEDURE — 36415 COLL VENOUS BLD VENIPUNCTURE: CPT | Mod: ZL | Performed by: NURSE PRACTITIONER

## 2025-01-16 PROCEDURE — 86140 C-REACTIVE PROTEIN: CPT | Mod: ZL | Performed by: NURSE PRACTITIONER

## 2025-01-16 RX ORDER — SULFAMETHOXAZOLE AND TRIMETHOPRIM 800; 160 MG/1; MG/1
1 TABLET ORAL 2 TIMES DAILY
Qty: 20 TABLET | Refills: 0 | Status: SHIPPED | OUTPATIENT
Start: 2025-01-16 | End: 2025-01-26

## 2025-01-16 RX ORDER — SPIRONOLACTONE 50 MG/1
50 TABLET, FILM COATED ORAL DAILY
Qty: 90 TABLET | Refills: 1 | Status: SHIPPED | OUTPATIENT
Start: 2025-01-16

## 2025-01-16 ASSESSMENT — PATIENT HEALTH QUESTIONNAIRE - PHQ9
SUM OF ALL RESPONSES TO PHQ QUESTIONS 1-9: 13
SUM OF ALL RESPONSES TO PHQ QUESTIONS 1-9: 13
10. IF YOU CHECKED OFF ANY PROBLEMS, HOW DIFFICULT HAVE THESE PROBLEMS MADE IT FOR YOU TO DO YOUR WORK, TAKE CARE OF THINGS AT HOME, OR GET ALONG WITH OTHER PEOPLE: SOMEWHAT DIFFICULT

## 2025-01-16 ASSESSMENT — PAIN SCALES - GENERAL: PAINLEVEL_OUTOF10: SEVERE PAIN (7)

## 2025-01-16 NOTE — PATIENT INSTRUCTIONS
"  Assessment & Plan     1. Chronic left shoulder pain (Primary)  - Physical Therapy  Referral; Future    2. Chronic bilateral low back pain with left-sided sciatica  - Physical Therapy  Referral; Future    3. Radicular pain of left lower extremity  - Physical Therapy  Referral; Future    4. Multiple joint pain  - Physical Therapy  Referral; Future  - Erythrocyte sedimentation rate auto; Future  - CRP inflammation; Future  - Rheumatoid factor; Future  - Cyclic Citrullinated Peptide Antibody IgG; Future  - HLA-B27 Typing; Future  - Anti Nuclear Amira IgG by IFA with Reflex; Future    5. Myalgia, multiple sites  - Physical Therapy  Referral; Future  - Erythrocyte sedimentation rate auto; Future  - CRP inflammation; Future  - Rheumatoid factor; Future  - Cyclic Citrullinated Peptide Antibody IgG; Future  - HLA-B27 Typing; Future  - Anti Nuclear Amira IgG by IFA with Reflex; Future    6. Slow transit constipation  Miralax as discussed.      7. PCOS (polycystic ovarian syndrome)  - spironolactone (ALDACTONE) 50 MG tablet; Take 1 tablet (50 mg) by mouth daily.  Dispense: 90 tablet; Refill: 1    8. Cellulitis of left breast  - sulfamethoxazole-trimethoprim (BACTRIM DS) 800-160 MG tablet; Take 1 tablet by mouth 2 times daily for 10 days.  Dispense: 20 tablet; Refill: 0    BMI  Estimated body mass index is 28.17 kg/m  as calculated from the following:    Height as of 12/26/24: 1.615 m (5' 3.58\").    Weight as of this encounter: 73.5 kg (162 lb).   Weight management plan: Discussed healthy diet and exercise guidelines    Will notify of results as they are returned.     Follow-up in 3 months or as needed     Ashley Spann,   Certified Adult Nurse Practitioner  [phone]    "

## 2025-01-20 LAB
ANA SER QL IF: NEGATIVE
CCP AB SER IA-ACNC: 2.4 U/ML

## 2025-01-22 LAB
B LOCUS: NORMAL
B27TEST METHOD: NORMAL

## 2025-01-28 ENCOUNTER — THERAPY VISIT (OUTPATIENT)
Dept: PHYSICAL THERAPY | Facility: OTHER | Age: 35
End: 2025-01-28
Attending: NURSE PRACTITIONER
Payer: COMMERCIAL

## 2025-01-28 DIAGNOSIS — G89.29 CHRONIC BILATERAL LOW BACK PAIN WITH LEFT-SIDED SCIATICA: ICD-10-CM

## 2025-01-28 DIAGNOSIS — M79.18 MYALGIA, MULTIPLE SITES: ICD-10-CM

## 2025-01-28 DIAGNOSIS — G89.29 CHRONIC LEFT SHOULDER PAIN: ICD-10-CM

## 2025-01-28 DIAGNOSIS — M54.10 RADICULAR PAIN OF LEFT LOWER EXTREMITY: ICD-10-CM

## 2025-01-28 DIAGNOSIS — M25.512 CHRONIC LEFT SHOULDER PAIN: ICD-10-CM

## 2025-01-28 DIAGNOSIS — M25.50 MULTIPLE JOINT PAIN: ICD-10-CM

## 2025-01-28 DIAGNOSIS — M54.42 CHRONIC BILATERAL LOW BACK PAIN WITH LEFT-SIDED SCIATICA: ICD-10-CM

## 2025-01-28 PROCEDURE — 97162 PT EVAL MOD COMPLEX 30 MIN: CPT | Mod: GP

## 2025-01-28 NOTE — PROGRESS NOTES
"PHYSICAL THERAPY EVALUATION  Type of Visit: Evaluation       Fall Risk Screen:  Fall screen completed by: PT  Have you fallen 2 or more times in the past year?: No  Have you fallen and had an injury in the past year?: No  Is patient a fall risk?: No    Subjective         Presenting condition or subjective complaint: left shoulder and lower back,hips  Date of onset: 11/01/24    Relevant medical history: Bladder or bowel problems; Change in skin color; Depression; DVT (blood clot); Mental Illness; Migraines or headaches; Overweight; Pregnant or breastfeeding; Severe headaches; Significant weakness; Sleep disorder like apnea; Smoking; Unexplained weight loss   Dates & types of surgery: csections 2009,2019. colostomy placement 2023 take down 2024    Prior diagnostic imaging/testing results: MRI; CT scan; X-ray     Prior therapy history for the same diagnosis, illness or injury: No      Prior Level of Function  Transfers: Independent  Ambulation: Independent  ADL: Independent  IADL:     Living Environment  Social support: With family members   Type of home: Apartment/condo; Townhome; 2-story   Stairs to enter the home: No       Ramp: No   Stairs inside the home: Yes 15 Is there a railing: Yes     Help at home: Other  Equipment owned: Walker with wheels     Employment: Yes [employer]  Hobbies/Interests: hiking fishing agate hunting outdoors with kids    Patient goals for therapy: keep up with 5year old daughter    Pain assessment:  Patient reports onset of present shoulder pain in November 2024.  She recalls her shoulder \"popped out \"and did not \"pop in \"again and noted increased pain .  She states she does have a history of her shoulder \"popping in and out \".  She has not had any previous treatment for her shoulder.  She has not had any diagnostic testing.  She was recently seen by her primary care provider and is now referred to physical therapy.  Patient also notes some pain into the low back and hip on her left " side but notes her shoulder as being her chief complaint of pain at this time.  She does note she has lost over 130 pounds in the last approximately a year and a half and is going to see a plastic surgeon about possibility of skin removal in through the chest and abdominal region.     Objective   SHOULDER EVALUATION  PAIN: Pain Level at Rest: 3/10  Pain Level with Use: 10/10  Pain Location: General Left shoulder upper arm especially along the posterior and anterior aspects.  She is noting some occasional pain into the neck region  Pain Quality: Miserable, Sharp, and Throbbing  Pain Frequency: constant  Pain is Exacerbated By: Laying on her side, reaching, pushing, washing her hair or back, donning and doffing shirts or pants, carrying objects, reaching something from back pocket, lifting her daughter, grooming daughter's hair  Pain is Relieved By: cold, heat, NSAIDs, and stretch  Pain Progression: Improved  INTEGUMENTARY (edema, incisions): WFL  POSTURE:  Rounded shoulder posture, mild forward head posture, left shoulder was elevated as compared to the right  GAIT:   Weightbearing Status: WBAT  Assistive Device(s): None  Gait Deviations: WFL  BALANCE/PROPRIOCEPTION:   WEIGHTBEARING ALIGNMENT:   ROM:  Left shoulder active range of motion: Flexion = 0 to 120 degrees with pain along the anterior shoulder.  She also noted tingling into the upper extremity to the fourth and fifth fingers.  Abduction = 0 to 81 degrees with tightness/pain along the superior and posterior shoulder, external Tatian = 0 to 70 degrees, internal Tatian = 0 to 65 degrees.  Passive range of motion left shoulder flexion = 0 to 160 degrees with pain especially with return to neutral, abduction = 0 to 80 degrees, internal X rotation at 45 degrees of abduction-internal Tatian 0 to 60 degrees X rotation = 0 to 45 degrees.  Limitations were due to shoulder upper arm pain    STRENGTH:  Manual muscle test not performed today.  Contractile unit test  revealed pain with resisted abduction  FLEXIBILITY:  Hypomobility noted in the upper trapezius, levator scapula, pectoral muscles  SPECIAL TESTS:  Shoulder impingement tests were negative.  Negative empty can and rotator cuff tests.  PALPATION:  Patient was very tender to even mild pressure generally throughout the left shoulder neck and upper arm  JOINT MOBILITY:  Decreased glenohumeral joint AP glide and inferior glides  CERVICAL SCREEN: WFL    Assessment & Plan   CLINICAL IMPRESSIONS  Medical Diagnosis:      Treatment Diagnosis:     Impression/Assessment: Patient is a 34 year old female with left shoulder/upper arm complaints.  The following significant findings have been identified: Pain, Decreased ROM/flexibility, Decreased joint mobility, Decreased strength, Impaired muscle performance, Decreased activity tolerance, and Impaired posture. These impairments interfere with their ability to perform self care tasks, work tasks, recreational activities, and household chores as compared to previous level of function.     Clinical Decision Making (Complexity):  Clinical Presentation: Evolving/Changing  Clinical Presentation Rationale: based on medical and personal factors listed in PT evaluation  Clinical Decision Making (Complexity): Moderate complexity    PLAN OF CARE  Treatment Interventions:  Modalities:  Modalities as indicated  Interventions: Manual Therapy, Neuromuscular Re-education, Therapeutic Activity, Therapeutic Exercise, Self-Care/Home Management    Long Term Goals     PT Goal 1  Goal Identifier: STG 1  Goal Description: Decreased pain when at its worst to a 8/10  Target Date: 02/11/25  PT Goal 2  Goal Identifier: LTG 1  Goal Description: Patient will demonstrate independence with home exercise program  Target Date: 03/25/25  PT Goal 3  Goal Identifier: LTG 2  Goal Description: Patient will be able to lie on left side with pain a 3 or less out of a send pain scale 0 to 1075% of the time  Target Date:  03/25/25      Frequency of Treatment: 2 times/week  Duration of Treatment: Up to 8 weeks    Recommended Referrals to Other Professionals:   Education Assessment:   Learner/Method: Patient;Listening;No Barriers to Learning  Education Comments: Patient education/discussion in today's objective findings along with recommended treatment plan.  She will continue to alternate heat/ice    Risks and benefits of evaluation/treatment have been explained.   Patient/Family/caregiver agrees with Plan of Care.     Evaluation Time:     PT Eval, Moderate Complexity Minutes (47954): 45       Signing Clinician: [facility]                                                                                   OUTPATIENT PHYSICAL THERAPY      PLAN OF TREATMENT FOR OUTPATIENT REHABILITATION   Patient's Last Name, First Name, Mesha Downing YOB: 1990   Provider's Name   [facility]   Medical Record No.  [id number]     Onset Date: 11/01/24  Start of Care Date: 01/28/25     Medical Diagnosis:         PT Treatment Diagnosis:    Plan of Treatment  Frequency/Duration: 2 times/week/ Up to 8 weeks    Certification date from 01/28/25 to 03/25/25         See note for plan of treatment details and functional goals     Ingrid Yepez PT                         I CERTIFY THE NEED FOR THESE SERVICES FURNISHED UNDER        THIS PLAN OF TREATMENT AND WHILE UNDER MY CARE     (Physician attestation of this document indicates review and certification of the therapy plan).              Referring Provider:  Ashley Spann    Initial Assessment  See Epic Evaluation- Start of Care Date: 01/28/25

## 2025-02-04 ENCOUNTER — THERAPY VISIT (OUTPATIENT)
Dept: PHYSICAL THERAPY | Facility: OTHER | Age: 35
End: 2025-02-04
Attending: NURSE PRACTITIONER
Payer: COMMERCIAL

## 2025-02-04 DIAGNOSIS — G89.29 CHRONIC LEFT SHOULDER PAIN: Primary | ICD-10-CM

## 2025-02-04 DIAGNOSIS — G89.29 CHRONIC BILATERAL LOW BACK PAIN WITH LEFT-SIDED SCIATICA: ICD-10-CM

## 2025-02-04 DIAGNOSIS — M25.512 CHRONIC LEFT SHOULDER PAIN: Primary | ICD-10-CM

## 2025-02-04 DIAGNOSIS — M54.42 CHRONIC BILATERAL LOW BACK PAIN WITH LEFT-SIDED SCIATICA: ICD-10-CM

## 2025-02-04 PROCEDURE — 97110 THERAPEUTIC EXERCISES: CPT | Mod: GP

## 2025-02-04 PROCEDURE — 97035 APP MDLTY 1+ULTRASOUND EA 15: CPT | Mod: GP

## 2025-02-11 ENCOUNTER — THERAPY VISIT (OUTPATIENT)
Dept: PHYSICAL THERAPY | Facility: OTHER | Age: 35
End: 2025-02-11
Attending: NURSE PRACTITIONER
Payer: COMMERCIAL

## 2025-02-11 DIAGNOSIS — M54.42 CHRONIC BILATERAL LOW BACK PAIN WITH LEFT-SIDED SCIATICA: ICD-10-CM

## 2025-02-11 DIAGNOSIS — G89.29 CHRONIC BILATERAL LOW BACK PAIN WITH LEFT-SIDED SCIATICA: ICD-10-CM

## 2025-02-11 DIAGNOSIS — G89.29 CHRONIC LEFT SHOULDER PAIN: Primary | ICD-10-CM

## 2025-02-11 DIAGNOSIS — M25.512 CHRONIC LEFT SHOULDER PAIN: Primary | ICD-10-CM

## 2025-02-11 PROCEDURE — 97035 APP MDLTY 1+ULTRASOUND EA 15: CPT | Mod: GP

## 2025-02-11 PROCEDURE — 97110 THERAPEUTIC EXERCISES: CPT | Mod: GP

## 2025-04-15 NOTE — PROGRESS NOTES
Assessment & Plan     1. PCOS (polycystic ovarian syndrome) (Primary)  Increase spironolactone.    - spironolactone (ALDACTONE) 100 MG tablet; Take 1 tablet (100 mg) by mouth daily.  Dispense: 90 tablet; Refill: 1    2. Excess skin  Follow-up with plastic surgery - will schedule pre-op.      3. Chronic bilateral low back pain with left-sided sciatica  Stable     4. Attention deficit hyperactivity disorder (ADHD), unspecified ADHD type  Follow-up with mental health as scheduled.            Follow-up   For pre-op     The longitudinal plan of care for the diagnosis(es)/condition(s) as documented were addressed during this visit. Due to the added complexity in care, I will continue to support Mesha in the subsequent management and with ongoing continuity of care.    Ashley Spann,   Certified Adult Nurse Practitioner  [phone]          Subjective   Mesha is a 34 year old, presenting for the following health issues:  Musculoskeletal Problem    HPI        PCOS:  she started spironolactone for hair growth, states it has been working, she notices much slower growth.  She is taking 50mg daily, would like to increase to 100mg daily.      Pain History:  When did you first notice your pain? months   Have you seen this provider for your pain in the past? Yes   Where in your body do you have pain? Lower back,Left hip  Are you seeing anyone else for your pain? No  She lost over 150 pounds from bariatric surgery.  She now has copious amounts of excess skin that is painful due to pulling.  She is working with plastic surgery and surgery is scheduled.  Will need a pre-op.          12/26/2024     1:02 PM 1/16/2025    11:01 AM 4/17/2025    10:47 AM   PHQ-9 SCORE   PHQ-9 Total Score Abelhart 8 (Mild depression) 13 (Moderate depression) 10 (Moderate depression)   PHQ-9 Total Score 8  13  10        Patient-reported           1/9/2020     2:38 PM 1/17/2020     1:58 PM 12/26/2024     1:03 PM   JOCELYNN-7 SCORE   Total Score  17  "(severe anxiety)  19 (severe anxiety)   Total Score 20 17 19        Patient-reported    Proxy-reported       PDMP Review         Value Time User    State PDMP site checked  Yes 12/26/2024  1:32 PM Ashley Spann NP          Last CSA Agreement:   CSA -- Patient Level:    CSA: None found at the patient level.         Review of Systems  Constitutional, HEENT, cardiovascular, pulmonary, GI, , musculoskeletal, neuro, skin, endocrine and psych systems are negative, except as otherwise noted.      Objective    /83 (BP Location: Left arm, Patient Position: Sitting, Cuff Size: Adult Regular)   Pulse (!) 131   Temp 97.1  F (36.2  C) (Tympanic)   Resp 18   Ht 1.62 m (5' 3.78\")   Wt 69.9 kg (154 lb)   SpO2 97%   Breastfeeding No   BMI 26.62 kg/m    Body mass index is 26.62 kg/m .  Physical Exam   GENERAL: alert and no distress  MS: no gross musculoskeletal defects noted, no edema  PSYCH: mentation appears normal, affect normal/bright    Office Visit on 01/16/2025   Component Date Value Ref Range Status    Erythrocyte Sedimentation Rate 01/16/2025 14  0 - 20 mm/hr Final    CRP Inflammation 01/16/2025 <3.00  <5.00 mg/L Final    Rheumatoid Factor 01/16/2025 <10  <14 IU/mL Final    Cyclic Citrullinated Peptide Antib* 01/16/2025 2.4  <7.0 U/mL Final    Negative    [id number] METHOD 01/16/2025 NGS   Final    B locus 01/16/2025 B27 Neg   Final    KEYSHA interpretation 01/16/2025 Negative  Negative Final      Negative:              <1:40  Borderline Positive:   1:40 - 1:80  Positive:              >1:80           Signed Electronically by: Ashley Spann NP    Answers submitted by the patient for this visit:  Patient Health Questionnaire (Submitted on 4/17/2025)  If you checked off any problems, how difficult have these problems made it for you to do your work, take care of things at home, or get along with other people?: Somewhat difficult  PHQ9 TOTAL SCORE: 10    "

## 2025-04-17 ENCOUNTER — OFFICE VISIT (OUTPATIENT)
Dept: FAMILY MEDICINE | Facility: OTHER | Age: 35
End: 2025-04-17
Attending: NURSE PRACTITIONER

## 2025-04-17 VITALS
SYSTOLIC BLOOD PRESSURE: 130 MMHG | OXYGEN SATURATION: 97 % | BODY MASS INDEX: 26.29 KG/M2 | HEART RATE: 131 BPM | RESPIRATION RATE: 18 BRPM | WEIGHT: 154 LBS | DIASTOLIC BLOOD PRESSURE: 83 MMHG | HEIGHT: 64 IN | TEMPERATURE: 97.1 F

## 2025-04-17 DIAGNOSIS — L98.7 EXCESS SKIN: ICD-10-CM

## 2025-04-17 DIAGNOSIS — M54.42 CHRONIC BILATERAL LOW BACK PAIN WITH LEFT-SIDED SCIATICA: ICD-10-CM

## 2025-04-17 DIAGNOSIS — F90.9 ATTENTION DEFICIT HYPERACTIVITY DISORDER (ADHD), UNSPECIFIED ADHD TYPE: ICD-10-CM

## 2025-04-17 DIAGNOSIS — E28.2 PCOS (POLYCYSTIC OVARIAN SYNDROME): Primary | ICD-10-CM

## 2025-04-17 DIAGNOSIS — G89.29 CHRONIC BILATERAL LOW BACK PAIN WITH LEFT-SIDED SCIATICA: ICD-10-CM

## 2025-04-17 RX ORDER — SPIRONOLACTONE 50 MG/1
50 TABLET, FILM COATED ORAL DAILY
Qty: 90 TABLET | Refills: 1 | Status: SHIPPED | OUTPATIENT
Start: 2025-04-17 | End: 2025-04-17

## 2025-04-17 RX ORDER — SPIRONOLACTONE 100 MG/1
100 TABLET, FILM COATED ORAL DAILY
Qty: 90 TABLET | Refills: 1 | Status: SHIPPED | OUTPATIENT
Start: 2025-04-17

## 2025-04-17 ASSESSMENT — PAIN SCALES - GENERAL: PAINLEVEL_OUTOF10: MILD PAIN (2)

## 2025-04-17 ASSESSMENT — PATIENT HEALTH QUESTIONNAIRE - PHQ9
SUM OF ALL RESPONSES TO PHQ QUESTIONS 1-9: 10
10. IF YOU CHECKED OFF ANY PROBLEMS, HOW DIFFICULT HAVE THESE PROBLEMS MADE IT FOR YOU TO DO YOUR WORK, TAKE CARE OF THINGS AT HOME, OR GET ALONG WITH OTHER PEOPLE: SOMEWHAT DIFFICULT
SUM OF ALL RESPONSES TO PHQ QUESTIONS 1-9: 10

## 2025-04-17 NOTE — PATIENT INSTRUCTIONS
Assessment & Plan     1. PCOS (polycystic ovarian syndrome) (Primary)  Continue current plan   - spironolactone (ALDACTONE) 100 MG tablet; Take 1 tablet (100 mg) by mouth daily.  Dispense: 90 tablet; Refill: 1    2. Excess skin  Follow-up with plastic surgery - will schedule pre-op.      3. Chronic bilateral low back pain with left-sided sciatica  Stable     4. Attention deficit hyperactivity disorder (ADHD), unspecified ADHD type  Follow-up with mental health as scheduled.            Follow-up   For pre-op     Ashley Spann,   Certified Adult Nurse Practitioner  [phone]

## 2025-05-30 ENCOUNTER — OFFICE VISIT (OUTPATIENT)
Dept: FAMILY MEDICINE | Facility: OTHER | Age: 35
End: 2025-05-30
Attending: NURSE PRACTITIONER

## 2025-05-30 VITALS
DIASTOLIC BLOOD PRESSURE: 76 MMHG | TEMPERATURE: 98.2 F | RESPIRATION RATE: 18 BRPM | SYSTOLIC BLOOD PRESSURE: 121 MMHG | HEIGHT: 64 IN | HEART RATE: 87 BPM | OXYGEN SATURATION: 99 % | BODY MASS INDEX: 26.8 KG/M2 | WEIGHT: 157 LBS

## 2025-05-30 DIAGNOSIS — F90.9 ATTENTION DEFICIT HYPERACTIVITY DISORDER (ADHD), UNSPECIFIED ADHD TYPE: ICD-10-CM

## 2025-05-30 DIAGNOSIS — Z01.818 PREOPERATIVE EXAMINATION: Primary | ICD-10-CM

## 2025-05-30 DIAGNOSIS — L98.7 EXCESS SKIN: ICD-10-CM

## 2025-05-30 LAB
ALBUMIN SERPL BCG-MCNC: 4.5 G/DL (ref 3.5–5.2)
ALP SERPL-CCNC: 70 U/L (ref 40–150)
ALT SERPL W P-5'-P-CCNC: 22 U/L (ref 0–50)
ANION GAP SERPL CALCULATED.3IONS-SCNC: 8 MMOL/L (ref 7–15)
AST SERPL W P-5'-P-CCNC: 22 U/L (ref 0–45)
BASOPHILS # BLD AUTO: 0.1 10E3/UL (ref 0–0.2)
BASOPHILS NFR BLD AUTO: 1 %
BILIRUB SERPL-MCNC: 0.6 MG/DL
BUN SERPL-MCNC: 12.2 MG/DL (ref 6–20)
CALCIUM SERPL-MCNC: 9.5 MG/DL (ref 8.8–10.4)
CHLORIDE SERPL-SCNC: 105 MMOL/L (ref 98–107)
CREAT SERPL-MCNC: 0.76 MG/DL (ref 0.51–0.95)
EGFRCR SERPLBLD CKD-EPI 2021: >90 ML/MIN/1.73M2
EOSINOPHIL # BLD AUTO: 0.7 10E3/UL (ref 0–0.7)
EOSINOPHIL NFR BLD AUTO: 7 %
ERYTHROCYTE [DISTWIDTH] IN BLOOD BY AUTOMATED COUNT: 12.7 % (ref 10–15)
GLUCOSE SERPL-MCNC: 96 MG/DL (ref 70–99)
HCO3 SERPL-SCNC: 27 MMOL/L (ref 22–29)
HCT VFR BLD AUTO: 43.1 % (ref 35–47)
HGB BLD-MCNC: 14.6 G/DL (ref 11.7–15.7)
IMM GRANULOCYTES # BLD: 0 10E3/UL
IMM GRANULOCYTES NFR BLD: 0 %
LYMPHOCYTES # BLD AUTO: 1.6 10E3/UL (ref 0.8–5.3)
LYMPHOCYTES NFR BLD AUTO: 16 %
MCH RBC QN AUTO: 31.9 PG (ref 26.5–33)
MCHC RBC AUTO-ENTMCNC: 33.9 G/DL (ref 31.5–36.5)
MCV RBC AUTO: 94 FL (ref 78–100)
MONOCYTES # BLD AUTO: 0.5 10E3/UL (ref 0–1.3)
MONOCYTES NFR BLD AUTO: 5 %
NEUTROPHILS # BLD AUTO: 7 10E3/UL (ref 1.6–8.3)
NEUTROPHILS NFR BLD AUTO: 71 %
PLATELET # BLD AUTO: 268 10E3/UL (ref 150–450)
POTASSIUM SERPL-SCNC: 4.2 MMOL/L (ref 3.4–5.3)
PROT SERPL-MCNC: 7.3 G/DL (ref 6.4–8.3)
RBC # BLD AUTO: 4.58 10E6/UL (ref 3.8–5.2)
SODIUM SERPL-SCNC: 140 MMOL/L (ref 135–145)
WBC # BLD AUTO: 9.9 10E3/UL (ref 4–11)

## 2025-05-30 ASSESSMENT — PAIN SCALES - GENERAL: PAINLEVEL_OUTOF10: SEVERE PAIN (8)

## 2025-05-30 NOTE — PROGRESS NOTES
Preoperative Evaluation  [facility]  [address]  [facility] [address]  Phone: [phone]  Primary Provider: Ashley Spann NP  Pre-op Performing Provider: Ashley Spann NP  May 30, 2025       5/30/2025   Surgical Information   What procedure is being done? skin removal/breast reconstruction   Facility or Hospital where procedure/surgery will be performed: [facility]    Who is doing the procedure / surgery? Dr. Herron   Date of surgery / procedure: June 6th 2025   Time of surgery / procedure: not set yet   Where do you plan to recover after surgery? at home with family     Fax number for surgical facility:     Assessment & Plan     The proposed surgical procedure is considered INTERMEDIATE risk.    1. Preoperative examination (Primary)  Exam completed   - EKG 12-lead complete w/read - (Clinic Performed)  - Comprehensive metabolic panel; Future  - CBC with Platelets & Differential; Future    2. Excess skin    3. Attention deficit hyperactivity disorder (ADHD), unspecified ADHD type            - No identified additional risk factors other than previously addressed    Antiplatelet or Anticoagulation Medication Instructions   - We reviewed the medication list and the patient is not on an antiplatelet or anticoagulation medications.    Additional Medication Instructions   - Herbal medications and vitamins: DO NOT TAKE 14 days prior to surgery.   - ibuprofen (Advil, Motrin): DO NOT TAKE 1 day before surgery.    - Psychostimulants: Hold the day of surgery    Recommendation  Approval given to proceed with proposed procedure, without further diagnostic evaluation.    Follow-up   Follow-up as needed     The longitudinal plan of care for the diagnosis(es)/condition(s) as documented were addressed during this visit. Due to the added complexity in care, I will continue to support Mesha in the subsequent management and with ongoing continuity of care.  Ashley  Maria Ines,   Certified Adult Nurse Practitioner  [phone]      Norberto Zimmer is a 34 year old, presenting for the following:  Pre-Op Exam          5/30/2025    10:53 AM   Additional Questions   Roomed by Melody KENNEY   Accompanied by none     HPI:           5/30/2025   Pre-Op Questionnaire   Have you ever had a heart attack or stroke? No   Have you ever had surgery on your heart or blood vessels, such as a stent placement, a coronary artery bypass, or surgery on an artery in your head, neck, heart, or legs? No   Do you have chest pain with activity? No   Do you have a history of heart failure? No   Do you currently have a cold, bronchitis or symptoms of other infection? No   Do you have a cough, shortness of breath, or wheezing? No   Do you or anyone in your family have previous history of blood clots? (!) YES self - after delivery - now resolved.    Do you or does anyone in your family have a serious bleeding problem such as prolonged bleeding following surgeries or cuts? No   Have you ever had problems with anemia or been told to take iron pills? No   Have you had any abnormal blood loss such as black, tarry or bloody stools, or abnormal vaginal bleeding? (!) UNKNOWN    Have you ever had a blood transfusion? No   Are you willing to have a blood transfusion if it is medically needed before, during, or after your surgery? Yes   Have you or any of your relatives ever had problems with anesthesia? No   Do you have sleep apnea, excessive snoring or daytime drowsiness? (!) YES   Do you have a CPAP machine? Yes   Do you have any artifical heart valves or other implanted medical devices like a pacemaker, defibrillator, or continuous glucose monitor? No   Do you have artificial joints? No   Are you allergic to latex? (!) YES     Advance Care Planning  Discussed advance care planning with patient; however, patient declined at this time.    Preoperative Review of    reviewed - controlled substances reflected in  medication list.      Status of Chronic Conditions:  Anxiety, ADHD, Chronic pain     Patient Active Problem List    Diagnosis Date Noted    Chronic left shoulder pain 2025     Priority: Medium    Chronic bilateral low back pain with left-sided sciatica 2025     Priority: Medium    Radicular pain of left lower extremity 2025     Priority: Medium    Symptomatic varicose veins, left 2024     Priority: Medium    Excess skin 2024     Priority: Medium    Attention deficit hyperactivity disorder (ADHD), unspecified ADHD type 2024     Priority: Medium    Generalized anxiety disorder 2024     Priority: Medium    Chest pain 2019     Priority: Medium    Anticoagulated 2019     Priority: Medium    Acute pulmonary embolism, unspecified pulmonary embolism type, unspecified whether acute cor pulmonale present (H) 2019     Priority: Medium    Pulmonary emboli (H) 2019     Priority: Medium      Past Medical History:   Diagnosis Date    Anxiety     PCOS (polycystic ovarian syndrome)     Pulmonary embolism (H)      Past Surgical History:   Procedure Laterality Date     SECTION  2019    PPROM, 23w3d     SECTION  2009    41 weeks    COLECTOMY PARTIAL N/A      Current Outpatient Medications   Medication Sig Dispense Refill    acetaminophen (TYLENOL) 325 MG tablet Take 325-650 mg by mouth every 6 hours as needed for mild pain      amphetamine-dextroamphetamine (ADDERALL XR) 30 MG 24 hr capsule Take 1 capsule (30 mg) by mouth daily.      amphetamine-dextroamphetamine (ADDERALL) 15 MG tablet Take 1 tablet (15 mg) by mouth 2 times daily.      ibuprofen (ADVIL/MOTRIN) 200 MG tablet Take 200 mg by mouth every 4 hours as needed for mild pain      LORazepam (ATIVAN) 0.5 MG tablet Take 1 tablet (0.5 mg) by mouth every 6 hours as needed for anxiety.      spironolactone (ALDACTONE) 100 MG tablet Take 1 tablet (100 mg) by mouth daily. 90 tablet 1  "      Allergies   Allergen Reactions    Latex Anaphylaxis    Strawberry Extract Anaphylaxis    Chocolate Swelling        Social History     Tobacco Use    Smoking status: Every Day     Types: Vaping Device     Passive exposure: Current    Smokeless tobacco: Never   Substance Use Topics    Alcohol use: Not Currently       History   Drug Use Unknown             Review of Systems  CONSTITUTIONAL: NEGATIVE for fever, chills, change in weight  INTEGUMENTARY/SKIN: NEGATIVE for worrisome rashes, moles or lesions  EYES: NEGATIVE for vision changes or irritation  ENT/MOUTH: NEGATIVE for ear, mouth and throat problems  RESP: NEGATIVE for significant cough or SOB  BREAST: NEGATIVE for masses, tenderness or discharge  CV: NEGATIVE for chest pain, palpitations or peripheral edema  GI: NEGATIVE for nausea, abdominal pain, heartburn, or change in bowel habits  : NEGATIVE for frequency, dysuria, or hematuria  MUSCULOSKELETAL: NEGATIVE for significant arthralgias or myalgia  NEURO: NEGATIVE for weakness, dizziness or paresthesias  ENDOCRINE: NEGATIVE for temperature intolerance, skin/hair changes  HEME: NEGATIVE for bleeding problems  PSYCHIATRIC: NEGATIVE for changes in mood or affect    Objective    /76 (BP Location: Right arm, Patient Position: Sitting, Cuff Size: Adult Regular)   Pulse 87   Temp 98.2  F (36.8  C) (Tympanic)   Resp 18   Ht 1.62 m (5' 3.78\")   Wt 71.2 kg (157 lb)   LMP 04/30/2025 (Approximate)   SpO2 99%   Breastfeeding No   BMI 27.14 kg/m     Estimated body mass index is 27.14 kg/m  as calculated from the following:    Height as of this encounter: 1.62 m (5' 3.78\").    Weight as of this encounter: 71.2 kg (157 lb).  Physical Exam  GENERAL: alert and no distress  EYES: Eyes grossly normal to inspection, PERRL and conjunctivae and sclerae normal  HENT: ear canals and TM's normal, nose and mouth without ulcers or lesions  NECK: no adenopathy, no asymmetry, masses, or scars  RESP: lungs clear to " "auscultation - no rales, rhonchi or wheezes  CV: regular rate and rhythm, normal S1 S2, no S3 or S4, no murmur, click or rub, no peripheral edema  MS: no gross musculoskeletal defects noted, no edema  PSYCH: mentation appears normal, affect normal/bright    No results for input(s): \"HGB\", \"PLT\", \"INR\", \"NA\", \"POTASSIUM\", \"CR\", \"A1C\" in the last 8760 hours.     Diagnostics  Results for orders placed or performed in visit on 05/30/25   Comprehensive metabolic panel     Status: Normal   Result Value Ref Range    Sodium 140 135 - 145 mmol/L    Potassium 4.2 3.4 - 5.3 mmol/L    Carbon Dioxide (CO2) 27 22 - 29 mmol/L    Anion Gap 8 7 - 15 mmol/L    Urea Nitrogen 12.2 6.0 - 20.0 mg/dL    Creatinine 0.76 0.51 - 0.95 mg/dL    GFR Estimate >90 >60 mL/min/1.73m2    Calcium 9.5 8.8 - 10.4 mg/dL    Chloride 105 98 - 107 mmol/L    Glucose 96 70 - 99 mg/dL    Alkaline Phosphatase 70 40 - 150 U/L    AST 22 0 - 45 U/L    ALT 22 0 - 50 U/L    Protein Total 7.3 6.4 - 8.3 g/dL    Albumin 4.5 3.5 - 5.2 g/dL    Bilirubin Total 0.6 <=1.2 mg/dL   CBC with platelets and differential     Status: None   Result Value Ref Range    WBC Count 9.9 4.0 - 11.0 10e3/uL    RBC Count 4.58 3.80 - 5.20 10e6/uL    Hemoglobin 14.6 11.7 - 15.7 g/dL    Hematocrit 43.1 35.0 - 47.0 %    MCV 94 78 - 100 fL    MCH 31.9 26.5 - 33.0 pg    MCHC 33.9 31.5 - 36.5 g/dL    RDW 12.7 10.0 - 15.0 %    Platelet Count 268 150 - 450 10e3/uL    % Neutrophils 71 %    % Lymphocytes 16 %    % Monocytes 5 %    % Eosinophils 7 %    % Basophils 1 %    % Immature Granulocytes 0 %    Absolute Neutrophils 7.0 1.6 - 8.3 10e3/uL    Absolute Lymphocytes 1.6 0.8 - 5.3 10e3/uL    Absolute Monocytes 0.5 0.0 - 1.3 10e3/uL    Absolute Eosinophils 0.7 0.0 - 0.7 10e3/uL    Absolute Basophils 0.1 0.0 - 0.2 10e3/uL    Absolute Immature Granulocytes 0.0 <=0.4 10e3/uL   EKG 12-lead complete w/read - (Clinic Performed)     Status: None (Preliminary result)   Result Value Ref Range    Systolic Blood " Pressure  mmHg    Diastolic Blood Pressure  mmHg    Ventricular Rate 80 BPM    Atrial Rate 80 BPM    SD Interval 142 ms    QRS Duration 72 ms     ms    QTc 410 ms    P Axis 53 degrees    R AXIS 65 degrees    T Axis 63 degrees    Interpretation ECG       Sinus rhythm with sinus arrhythmia  Normal ECG  When compared with ECG of 26-Dec-2019 05:30,  No significant change was found     CBC with Platelets & Differential     Status: None    Narrative    The following orders were created for panel order CBC with Platelets & Differential.  Procedure                               Abnormality         Status                     ---------                               -----------         ------                     CBC with platelets and ...[[id number]]                      Final result                 Please view results for these tests on the individual orders.            Revised Cardiac Risk Index (RCRI)  The patient has the following serious cardiovascular risks for perioperative complications:   - No serious cardiac risks = 0 points     RCRI Interpretation: 0 points: Class I (very low risk - 0.4% complication rate)         Signed Electronically by: Ashley Spann NP  A copy of this evaluation report is provided to the requesting physician.

## 2025-06-02 ENCOUNTER — RESULTS FOLLOW-UP (OUTPATIENT)
Dept: FAMILY MEDICINE | Facility: OTHER | Age: 35
End: 2025-06-02

## 2025-06-04 LAB
ATRIAL RATE - MUSE: 80 BPM
DIASTOLIC BLOOD PRESSURE - MUSE: NORMAL MMHG
INTERPRETATION ECG - MUSE: NORMAL
P AXIS - MUSE: 53 DEGREES
PR INTERVAL - MUSE: 142 MS
QRS DURATION - MUSE: 72 MS
QT - MUSE: 356 MS
QTC - MUSE: 410 MS
R AXIS - MUSE: 65 DEGREES
SYSTOLIC BLOOD PRESSURE - MUSE: NORMAL MMHG
T AXIS - MUSE: 63 DEGREES
VENTRICULAR RATE- MUSE: 80 BPM

## (undated) RX ORDER — ATROPINE SULFATE 0.4 MG/ML
AMPUL (ML) INJECTION
Status: DISPENSED
Start: 2019-12-26

## (undated) RX ORDER — METOPROLOL TARTRATE 1 MG/ML
INJECTION, SOLUTION INTRAVENOUS
Status: DISPENSED
Start: 2019-12-26

## (undated) RX ORDER — DOBUTAMINE HYDROCHLORIDE 200 MG/100ML
INJECTION INTRAVENOUS
Status: DISPENSED
Start: 2019-12-26